# Patient Record
Sex: FEMALE | Race: BLACK OR AFRICAN AMERICAN | Employment: UNEMPLOYED | URBAN - METROPOLITAN AREA
[De-identification: names, ages, dates, MRNs, and addresses within clinical notes are randomized per-mention and may not be internally consistent; named-entity substitution may affect disease eponyms.]

---

## 2017-10-08 ENCOUNTER — HOSPITAL ENCOUNTER (EMERGENCY)
Age: 27
Discharge: HOME | End: 2017-10-08
Attending: EMERGENCY MEDICINE
Payer: SELF-PAY

## 2017-10-08 VITALS
HEART RATE: 60 BPM | DIASTOLIC BLOOD PRESSURE: 60 MMHG | RESPIRATION RATE: 18 BRPM | SYSTOLIC BLOOD PRESSURE: 134 MMHG | BODY MASS INDEX: 39.55 KG/M2 | HEIGHT: 67 IN | OXYGEN SATURATION: 100 % | TEMPERATURE: 98.7 F | WEIGHT: 252 LBS

## 2017-10-08 DIAGNOSIS — N94.6 MENSTRUAL CRAMPS: Primary | ICD-10-CM

## 2017-10-08 LAB — HCG UR QL: NEGATIVE

## 2017-10-08 PROCEDURE — 74011250636 HC RX REV CODE- 250/636: Performed by: EMERGENCY MEDICINE

## 2017-10-08 PROCEDURE — 81003 URINALYSIS AUTO W/O SCOPE: CPT | Performed by: EMERGENCY MEDICINE

## 2017-10-08 PROCEDURE — 96372 THER/PROPH/DIAG INJ SC/IM: CPT | Performed by: EMERGENCY MEDICINE

## 2017-10-08 PROCEDURE — 81025 URINE PREGNANCY TEST: CPT

## 2017-10-08 PROCEDURE — 99284 EMERGENCY DEPT VISIT MOD MDM: CPT | Performed by: EMERGENCY MEDICINE

## 2017-10-08 RX ORDER — KETOROLAC TROMETHAMINE 30 MG/ML
60 INJECTION, SOLUTION INTRAMUSCULAR; INTRAVENOUS
Status: COMPLETED | OUTPATIENT
Start: 2017-10-08 | End: 2017-10-08

## 2017-10-08 RX ADMIN — KETOROLAC TROMETHAMINE 60 MG: 30 INJECTION, SOLUTION INTRAMUSCULAR at 16:07

## 2017-10-08 NOTE — DISCHARGE INSTRUCTIONS
Painful Menstrual Cramps: Care Instructions  Your Care Instructions    Painful menstrual cramps are very common. Many women go to the doctor because of bad cramps when they get their period. You may have cramps in your back, thighs, and belly. You may also have diarrhea, constipation, or nausea. Some women also get dizzy. Pain medicine and home treatment can help you feel better. Follow-up care is a key part of your treatment and safety. Be sure to make and go to all appointments, and call your doctor if you are having problems. It's also a good idea to know your test results and keep a list of the medicines you take. How can you care for yourself at home? · Take anti-inflammatory medicines for pain. Ibuprofen (Advil, Motrin) and naproxen (Aleve) usually work better than aspirin. ¨ Be safe with medicines. Talk to your doctor or pharmacist before you take any of these medicines. They may not be safe if you take other medicines or have other health problems. ¨ Start taking the recommended dose of pain medicine as soon as you start to feel pain. Or you can start on the day before your period. Keep taking the medicine for as many days as you have cramps. ¨ If anti-inflammatory medicines don't help, try acetaminophen (Tylenol). ¨ Do not take two or more pain medicines at the same time unless the doctor told you to. Many pain medicines have acetaminophen, which is Tylenol. Too much acetaminophen (Tylenol) can be harmful. ¨ Read and follow all instructions on the label. · Put a heating pad set on low or a hot water bottle on your belly. Or take a warm bath. Heat improves blood flow and may help with pain. · Lie down and put a pillow under your knees. Or lie on your side and bring your knees up to your chest. This will help with any back pressure. · Get at least 30 minutes of exercise on most days of the week. This improves blood flow and may decrease pain. Walking is a good choice.  You also may want to do other activities, such as running, swimming, cycling, or playing tennis or team sports. When should you call for help? Call 911 anytime you think you may need emergency care. For example, call if:  · You passed out (lost consciousness). Call your doctor now or seek immediate medical care if:  · You have sudden, severe pain in your belly or pelvis. · You have severe vaginal bleeding. This means that you are soaking through your usual pads or tampons every hour for 2 or more hours and passing clots of blood. · You are dizzy or lightheaded, or you feel like you may faint. Watch closely for changes in your health, and be sure to contact your doctor if:  · You think you may be pregnant. · You have new belly or pelvic pain. · You are taking pain medicine, but it is not helping. · You feel weak and tired. Where can you learn more? Go to http://maykel-merissa.info/. Enter [id number] in the search box to learn more about \"Painful Menstrual Cramps: Care Instructions. \"  Current as of: October 13, 2016  Content Version: 11.3  © 4176-0400 [employer]. Care instructions adapted under license by [employer] (which disclaims liability or warranty for this information). If you have questions about a medical condition or this instruction, always ask your healthcare professional. [address] any warranty or liability for your use of this information.

## 2017-10-08 NOTE — LETTER
[facility] EMERGENCY DEPT 
[facility] [address] [place] [id number] 
[phone] Work/School Note Date: 10/8/2017 To Whom It May concern: 
 
Meenu Maldonado was seen and treated today in the emergency room by the following provider(s): 
Attending Provider: Heidi Ruiz MD. Meenu Maldonado may return to work on 10/9/17.  
 
Sincerely, 
 
 
 
 
Heidi Ruiz MD

## 2017-10-08 NOTE — ED PROVIDER NOTES
HPI Comments: Patient presents with menstrual cramping. She states she started her period 4 days ago and has noticed that her cramping is little bit worse than usual.  She also mentioned she might be bleeding somewhat heavier than typically. She gets regular periods. She denies any history of fibroids or ovarian cyst.  No previous abdominal surgeries. She states she is going through about a pad every 1.5 hours. She describes the pain as crampy. No vomiting, diarrhea, fevers. She took 400 mg of ibuprofen at home with limited relief. Patient states she does not believe she is pregnant. Patient is a 32 y.o. female presenting with menstrual problem. The history is provided by the patient. No  was used. Menstrual Problem   Primary symptoms include pelvic pain, vaginal bleeding. Pertinent negatives include no abdominal pain, no diarrhea, no nausea, no vomiting and no fever. Past Medical History:   Diagnosis Date    Psychiatric disorder     depression       Past Surgical History:   Procedure Laterality Date    BREAST SURGERY PROCEDURE UNLISTED      reduction         History reviewed. No pertinent family history. Social History     Social History    Marital status: SINGLE     Spouse name: N/A    Number of children: N/A    Years of education: N/A     Occupational History    Not on file. Social History Main Topics    Smoking status: Former Smoker     Packs/day: 0.50     Quit date: 2/2/2015    Smokeless tobacco: Never Used    Alcohol use Yes      Comment: socially    Drug use: No      Comment: last used 4/17/2012    Sexual activity: Not on file     Other Topics Concern    Not on file     Social History Narrative         ALLERGIES: Review of patient's allergies indicates no known allergies. Review of Systems   Constitutional: Negative for fatigue and fever. HENT: Negative for sore throat.     Gastrointestinal: Negative for abdominal pain, diarrhea, nausea and vomiting. Genitourinary: Positive for menstrual problem, pelvic pain and vaginal bleeding. Neurological: Negative for headaches. Vitals:    10/08/17 1454   BP: 126/69   Pulse: (!) 56   Resp: 18   Temp: 98.7 °F (37.1 °C)   SpO2: 99%   Weight: 114.3 kg (252 lb)   Height: 5' 7\" (1.702 m)            Physical Exam   Constitutional: She is oriented to person, place, and time. She appears well-developed and well-nourished. No distress. HENT:   Head: Normocephalic and atraumatic. Eyes: Conjunctivae and EOM are normal. Pupils are equal, round, and reactive to light. Neck: Normal range of motion. Neck supple. Cardiovascular: Normal rate, regular rhythm and normal heart sounds. Pulmonary/Chest: Effort normal and breath sounds normal. No respiratory distress. She has no wheezes. She has no rales. Abdominal: Soft. She exhibits no distension. There is no tenderness. There is no rebound. Genitourinary:   Genitourinary Comments: Patient declined a pelvic exam   Musculoskeletal: Normal range of motion. She exhibits no edema or tenderness. Neurological: She is alert and oriented to person, place, and time. Skin: Skin is warm and dry. No rash noted. She is not diaphoretic. Psychiatric: She has a normal mood and affect. Her behavior is normal.   Vitals reviewed. MDM  Number of Diagnoses or Management Options  Menstrual cramps: new and does not require workup  Diagnosis management comments: Patient appears quite comfortable with stable vital signs. We'll give Toradol. Patient declined a pelvic exam.  She states she is really not by concerned about her amount of bleeding at this time. She improved after Toradol. Patient discharged home in stable condition. Urine showed no signs of infection or pregnancy.        Amount and/or Complexity of Data Reviewed  Clinical lab tests: ordered and reviewed (Results for orders placed or performed during the hospital encounter of 10/08/17  -HCG URINE, QL. - POC       Result                                            Value                         Ref Range                       Pregnancy test,urine (POC)                        NEGATIVE                      NEG                        )  Review and summarize past medical records: yes  Independent visualization of images, tracings, or specimens: yes    Risk of Complications, Morbidity, and/or Mortality  Presenting problems: low  Diagnostic procedures: low  Management options: low    Patient Progress  Patient progress: improved    ED Course       Procedures

## 2017-10-08 NOTE — ED TRIAGE NOTES
Pt states her period is on time but she is having more pain than normal and feels she is bleeding more than normal.

## 2017-10-23 ENCOUNTER — HOSPITAL ENCOUNTER (EMERGENCY)
Age: 27
Discharge: HOME | End: 2017-10-23
Attending: EMERGENCY MEDICINE
Payer: SELF-PAY

## 2017-10-23 VITALS
OXYGEN SATURATION: 99 % | RESPIRATION RATE: 16 BRPM | HEART RATE: 76 BPM | SYSTOLIC BLOOD PRESSURE: 128 MMHG | TEMPERATURE: 98 F | BODY MASS INDEX: 39.55 KG/M2 | WEIGHT: 252 LBS | HEIGHT: 67 IN | DIASTOLIC BLOOD PRESSURE: 76 MMHG

## 2017-10-23 DIAGNOSIS — J01.90 ACUTE NON-RECURRENT SINUSITIS, UNSPECIFIED LOCATION: Primary | ICD-10-CM

## 2017-10-23 PROCEDURE — 99283 EMERGENCY DEPT VISIT LOW MDM: CPT | Performed by: EMERGENCY MEDICINE

## 2017-10-23 RX ORDER — PROMETHAZINE HYDROCHLORIDE 25 MG/1
25 TABLET ORAL
Qty: 12 TAB | Refills: 0 | Status: SHIPPED | OUTPATIENT
Start: 2017-10-23 | End: 2017-11-21 | Stop reason: ALTCHOICE

## 2017-10-23 RX ORDER — GUAIFENESIN AND PSEUDOEPHEDRINE HCL 1200; 120 MG/1; MG/1
1 TABLET, EXTENDED RELEASE ORAL EVERY 12 HOURS
Qty: 20 TAB | Refills: 0 | Status: SHIPPED | OUTPATIENT
Start: 2017-10-23

## 2017-10-23 NOTE — LETTER
400 [facility] EMERGENCY DEPT 
[facility] 52 [address] [facility] [id number] 
[phone] Work/School Note Date: 10/23/2017 To Whom It May concern: 
 
Ralph Wiggins was seen and treated today in the emergency room by the following provider(s): 
Attending Provider: Elba Cantu MD. Ralph Wiggins may return to work on 10/24/2017.  
 
Sincerely,

## 2017-10-24 NOTE — ED NOTES
I have reviewed discharge instructions with the patient. The patient verbalized understanding. Patient left ED via Discharge Method: ambulatory to Home with self and family member. Opportunity for questions and clarification provided. Patient given 2 scripts.

## 2017-10-24 NOTE — DISCHARGE INSTRUCTIONS
Saline Nasal Washes: Care Instructions  Your Care Instructions  Saline nasal washes help keep the nasal passages open by washing out thick or dried mucus. This simple remedy can help relieve symptoms of allergies, sinusitis, and colds. It also can make the nose feel more comfortable by keeping the mucous membranes moist. You may notice a little burning sensation in your nose the first few times you use the solution, but this usually gets better in a few days. Follow-up care is a key part of your treatment and safety. Be sure to make and go to all appointments, and call your doctor if you are having problems. It's also a good idea to know your test results and keep a list of the medicines you take. How can you care for yourself at home? · You can buy premixed saline solution in a squeeze bottle or other sinus rinse products at a drugstore. Read and follow the instructions on the label. · You also can make your own saline solution by adding 1 teaspoon of salt and 1 teaspoon of baking soda to 2 cups of distilled water. · If you use a homemade solution, pour a small amount into a clean bowl. Using a rubber bulb syringe, squeeze the syringe and place the tip in the salt water. Pull a small amount of the salt water into the syringe by relaxing your hand. · Sit down with your head tilted slightly back. Do not lie down. Put the tip of the bulb syringe or the squeeze bottle a little way into one of your nostrils. Gently drip or squirt a few drops into the nostril. Repeat with the other nostril. Some sneezing and gagging are normal at first.  · Gently blow your nose. · Wipe the syringe or bottle tip clean after each use. · Repeat this 2 or 3 times a day. · Use nasal washes gently if you have nosebleeds often. When should you call for help? Watch closely for changes in your health, and be sure to contact your doctor if:  · You often get nosebleeds. · You have problems doing the nasal washes.   Where can you learn more? Go to http://maykel-merissa.info/. Enter [id number] in the search box to learn more about \"Saline Nasal Washes: Care Instructions. \"  Current as of: May 4, 2017  Content Version: 11.3  © 0860-4518 [employer]. Care instructions adapted under license by [employer] (which disclaims liability or warranty for this information). If you have questions about a medical condition or this instruction, always ask your healthcare professional. [address] any warranty or liability for your use of this information. Sinusitis: Care Instructions  Your Care Instructions    Sinusitis is an infection of the lining of the sinus cavities in your head. Sinusitis often follows a cold. It causes pain and pressure in your head and face. In most cases, sinusitis gets better on its own in 1 to 2 weeks. But some mild symptoms may last for several weeks. Sometimes antibiotics are needed. Follow-up care is a key part of your treatment and safety. Be sure to make and go to all appointments, and call your doctor if you are having problems. It's also a good idea to know your test results and keep a list of the medicines you take. How can you care for yourself at home? · Take an over-the-counter pain medicine, such as acetaminophen (Tylenol), ibuprofen (Advil, Motrin), or naproxen (Aleve). Read and follow all instructions on the label. · If the doctor prescribed antibiotics, take them as directed. Do not stop taking them just because you feel better. You need to take the full course of antibiotics. · Be careful when taking over-the-counter cold or flu medicines and Tylenol at the same time. Many of these medicines have acetaminophen, which is Tylenol. Read the labels to make sure that you are not taking more than the recommended dose. Too much acetaminophen (Tylenol) can be harmful.   · Breathe warm, moist air from a steamy shower, a hot bath, or a sink filled with hot water. Avoid cold, dry air. Using a humidifier in your home may help. Follow the directions for cleaning the machine. · Use saline (saltwater) nasal washes to help keep your nasal passages open and wash out mucus and bacteria. You can buy saline nose drops at a grocery store or drugstore. Or you can make your own at home by adding 1 teaspoon of salt and 1 teaspoon of baking soda to 2 cups of distilled water. If you make your own, fill a bulb syringe with the solution, insert the tip into your nostril, and squeeze gently. Roz Hals your nose. · Put a hot, wet towel or a warm gel pack on your face 3 or 4 times a day for 5 to 10 minutes each time. · Try a decongestant nasal spray like oxymetazoline (Afrin). Do not use it for more than 3 days in a row. Using it for more than 3 days can make your congestion worse. When should you call for help? Call your doctor now or seek immediate medical care if:  · You have new or worse swelling or redness in your face or around your eyes. · You have a new or higher fever. Watch closely for changes in your health, and be sure to contact your doctor if:  · You have new or worse facial pain. · The mucus from your nose becomes thicker (like pus) or has new blood in it. · You are not getting better as expected. Where can you learn more? Go to http://maykel-merissa.info/. Enter I712 in the search box to learn more about \"Sinusitis: Care Instructions. \"  Current as of: July 29, 2016  Content Version: 11.3  © 0248-4620 [employer]. Care instructions adapted under license by [employer] (which disclaims liability or warranty for this information). If you have questions about a medical condition or this instruction, always ask your healthcare professional. [address] any warranty or liability for your use of this information.

## 2017-10-24 NOTE — ED PROVIDER NOTES
HPI Comments: 31 yo female presents with pain in her left face and neck for the past 4 days. She states pain worse at night she is unable to sleep. She has sinus congestion, no drainage. Denies sore throat. She is currently taking clindamycin for left dental pain. She reports nausea with any attempt at eating. No vomiting. She only occasionally takes antibiotic with food. No fever. Patient is a 32 y.o. female presenting with ear pain and headaches. The history is provided by the patient. Ear Pain    Associated symptoms include headaches. Pertinent negatives include no hearing loss, no sore throat, no abdominal pain, no diarrhea, no vomiting, no cough and no rash. Headache    Associated symptoms include nausea. Pertinent negatives include no fever, no palpitations, no shortness of breath, no weakness and no vomiting. Past Medical History:   Diagnosis Date    Psychiatric disorder     depression       Past Surgical History:   Procedure Laterality Date    BREAST SURGERY PROCEDURE UNLISTED      reduction         No family history on file. Social History     Social History    Marital status: SINGLE     Spouse name: N/A    Number of children: N/A    Years of education: N/A     Occupational History    Not on file. Social History Main Topics    Smoking status: Current Some Day Smoker     Packs/day: 0.50     Last attempt to quit: 10/16/2017    Smokeless tobacco: Never Used    Alcohol use Yes      Comment: socially    Drug use: No      Comment: last used 4/17/2012    Sexual activity: Not on file     Other Topics Concern    Not on file     Social History Narrative         ALLERGIES: Review of patient's allergies indicates no known allergies. Review of Systems   Constitutional: Negative for chills and fever. HENT: Positive for congestion, ear pain, sinus pain and sinus pressure. Negative for hearing loss and sore throat. Eyes: Negative for visual disturbance.    Respiratory: Negative for cough and shortness of breath. Cardiovascular: Negative for chest pain and palpitations. Gastrointestinal: Positive for nausea. Negative for abdominal pain, diarrhea and vomiting. Musculoskeletal: Negative for back pain. Skin: Negative for rash. Neurological: Positive for headaches. Negative for weakness. Psychiatric/Behavioral: Negative for confusion. Vitals:    10/23/17 2153   BP: 133/79   Pulse: 73   Resp: 18   Temp: 98 °F (36.7 °C)   SpO2: 99%   Weight: 114.3 kg (252 lb)   Height: 5' 7\" (1.702 m)            Physical Exam   Constitutional: She appears well-developed and well-nourished. HENT:   Head: Normocephalic and atraumatic. Right Ear: Tympanic membrane and external ear normal.   Left Ear: Tympanic membrane and external ear normal.   Nose: Left sinus exhibits maxillary sinus tenderness and frontal sinus tenderness. Mouth/Throat: Uvula is midline and oropharynx is clear and moist. No trismus in the jaw. No oropharyngeal exudate or posterior oropharyngeal erythema. Eyes: Conjunctivae are normal. Pupils are equal, round, and reactive to light. Neck: Normal range of motion. Neck supple. No erythema and normal range of motion present. Cardiovascular: Regular rhythm, normal heart sounds and intact distal pulses. Pulmonary/Chest: Effort normal and breath sounds normal. No respiratory distress. She has no wheezes. Abdominal: Soft. Bowel sounds are normal. She exhibits no distension. There is no tenderness. Musculoskeletal: Normal range of motion. She exhibits no edema. Neurological: She is alert. Skin: Skin is warm and dry. Psychiatric: Judgment normal.   Nursing note and vitals reviewed. MDM  Number of Diagnoses or Management Options  Acute non-recurrent sinusitis, unspecified location:   Diagnosis management comments: Parts of this document were created using dragon voice recognition software.  The chart has been reviewed but errors may still be present. Advised Mucinex D. Given Phenergan for nausea. Advised to take antibiotic with food. I discussed the results of all labs, procedures, radiographs, and treatments with the patient and available family. Treatment plan is agreed upon and the patient is ready for discharge. Questions about treatment in the ED and differential diagnosis of presenting condition were answered. Patient was given verbal discharge instructions including, but not limited to, importance of returning to the emergency department for any concern of worsening or continued symptoms. Instructions were given to follow up with a primary care provider or specialist within 1-2 days. Adverse effects of medications, if prescribed, were discussed and patient was advised to refrain from significant physical activity until followed up by primary care physician and to not drive or operate heavy machinery after taking any sedating substances.         ED Course       Procedures

## 2017-10-25 ENCOUNTER — APPOINTMENT (OUTPATIENT)
Dept: CT IMAGING | Age: 27
End: 2017-10-25
Attending: EMERGENCY MEDICINE
Payer: SELF-PAY

## 2017-10-25 ENCOUNTER — HOSPITAL ENCOUNTER (EMERGENCY)
Age: 27
Discharge: HOME | End: 2017-10-25
Attending: EMERGENCY MEDICINE
Payer: SELF-PAY

## 2017-10-25 VITALS
WEIGHT: 252 LBS | RESPIRATION RATE: 16 BRPM | OXYGEN SATURATION: 98 % | SYSTOLIC BLOOD PRESSURE: 140 MMHG | DIASTOLIC BLOOD PRESSURE: 80 MMHG | TEMPERATURE: 98.9 F | BODY MASS INDEX: 39.55 KG/M2 | HEIGHT: 67 IN | HEART RATE: 60 BPM

## 2017-10-25 DIAGNOSIS — R22.1 NECK SWELLING: Primary | ICD-10-CM

## 2017-10-25 LAB
ALBUMIN SERPL-MCNC: 3.8 G/DL (ref 3.5–5)
ALBUMIN/GLOB SERPL: 0.9 {RATIO} (ref 1.2–3.5)
ALP SERPL-CCNC: 77 U/L (ref 50–136)
ALT SERPL-CCNC: 28 U/L (ref 12–65)
ANION GAP SERPL CALC-SCNC: 10 MMOL/L (ref 7–16)
AST SERPL-CCNC: 18 U/L (ref 15–37)
BASOPHILS # BLD: 0 K/UL (ref 0–0.2)
BASOPHILS NFR BLD: 0 % (ref 0–2)
BILIRUB SERPL-MCNC: 0.3 MG/DL (ref 0.2–1.1)
BUN SERPL-MCNC: 7 MG/DL (ref 6–23)
CALCIUM SERPL-MCNC: 9.5 MG/DL (ref 8.3–10.4)
CHLORIDE SERPL-SCNC: 102 MMOL/L (ref 98–107)
CO2 SERPL-SCNC: 27 MMOL/L (ref 21–32)
CREAT SERPL-MCNC: 0.78 MG/DL (ref 0.6–1)
DEPRECATED S PYO AG THROAT QL EIA: NEGATIVE
DIFFERENTIAL METHOD BLD: ABNORMAL
EOSINOPHIL # BLD: 0.2 K/UL (ref 0–0.8)
EOSINOPHIL NFR BLD: 2 % (ref 0.5–7.8)
ERYTHROCYTE [DISTWIDTH] IN BLOOD BY AUTOMATED COUNT: 15.3 % (ref 11.9–14.6)
GLOBULIN SER CALC-MCNC: 4.2 G/DL (ref 2.3–3.5)
GLUCOSE SERPL-MCNC: 95 MG/DL (ref 65–100)
HCT VFR BLD AUTO: 39.1 % (ref 35.8–46.3)
HGB BLD-MCNC: 12.5 G/DL (ref 11.7–15.4)
IMM GRANULOCYTES # BLD: 0 K/UL (ref 0–0.5)
IMM GRANULOCYTES NFR BLD: 0 % (ref 0–5)
LYMPHOCYTES # BLD: 3 K/UL (ref 0.5–4.6)
LYMPHOCYTES NFR BLD: 41 % (ref 13–44)
MCH RBC QN AUTO: 25.8 PG (ref 26.1–32.9)
MCHC RBC AUTO-ENTMCNC: 32 G/DL (ref 31.4–35)
MCV RBC AUTO: 80.6 FL (ref 79.6–97.8)
MONOCYTES # BLD: 0.5 K/UL (ref 0.1–1.3)
MONOCYTES NFR BLD: 7 % (ref 4–12)
NEUTS SEG # BLD: 3.6 K/UL (ref 1.7–8.2)
NEUTS SEG NFR BLD: 50 % (ref 43–78)
PLATELET # BLD AUTO: 368 K/UL (ref 150–450)
PMV BLD AUTO: 9.3 FL (ref 10.8–14.1)
POTASSIUM SERPL-SCNC: 3.7 MMOL/L (ref 3.5–5.1)
PROT SERPL-MCNC: 8 G/DL (ref 6.3–8.2)
RBC # BLD AUTO: 4.85 M/UL (ref 4.05–5.25)
SODIUM SERPL-SCNC: 139 MMOL/L (ref 136–145)
T4 FREE SERPL-MCNC: 1.1 NG/DL (ref 0.78–1.46)
TSH SERPL DL<=0.005 MIU/L-ACNC: 2.2 UIU/ML (ref 0.36–3.74)
WBC # BLD AUTO: 7.3 K/UL (ref 4.3–11.1)

## 2017-10-25 PROCEDURE — 87880 STREP A ASSAY W/OPTIC: CPT | Performed by: EMERGENCY MEDICINE

## 2017-10-25 PROCEDURE — 74011000258 HC RX REV CODE- 258: Performed by: EMERGENCY MEDICINE

## 2017-10-25 PROCEDURE — 80053 COMPREHEN METABOLIC PANEL: CPT | Performed by: EMERGENCY MEDICINE

## 2017-10-25 PROCEDURE — 70491 CT SOFT TISSUE NECK W/DYE: CPT

## 2017-10-25 PROCEDURE — 99283 EMERGENCY DEPT VISIT LOW MDM: CPT | Performed by: EMERGENCY MEDICINE

## 2017-10-25 PROCEDURE — 84443 ASSAY THYROID STIM HORMONE: CPT | Performed by: EMERGENCY MEDICINE

## 2017-10-25 PROCEDURE — 87081 CULTURE SCREEN ONLY: CPT | Performed by: EMERGENCY MEDICINE

## 2017-10-25 PROCEDURE — 84439 ASSAY OF FREE THYROXINE: CPT | Performed by: EMERGENCY MEDICINE

## 2017-10-25 PROCEDURE — 74011636320 HC RX REV CODE- 636/320: Performed by: EMERGENCY MEDICINE

## 2017-10-25 PROCEDURE — 85025 COMPLETE CBC W/AUTO DIFF WBC: CPT | Performed by: EMERGENCY MEDICINE

## 2017-10-25 RX ORDER — SODIUM CHLORIDE 0.9 % (FLUSH) 0.9 %
10 SYRINGE (ML) INJECTION
Status: COMPLETED | OUTPATIENT
Start: 2017-10-25 | End: 2017-10-25

## 2017-10-25 RX ORDER — AMOXICILLIN AND CLAVULANATE POTASSIUM 875; 125 MG/1; MG/1
1 TABLET, FILM COATED ORAL 2 TIMES DAILY
Qty: 20 TAB | Refills: 0 | Status: SHIPPED | OUTPATIENT
Start: 2017-10-25 | End: 2017-11-04

## 2017-10-25 RX ADMIN — Medication 10 ML: at 16:23

## 2017-10-25 RX ADMIN — SODIUM CHLORIDE 100 ML: 900 INJECTION, SOLUTION INTRAVENOUS at 16:23

## 2017-10-25 RX ADMIN — IOPAMIDOL 80 ML: 755 INJECTION, SOLUTION INTRAVENOUS at 16:23

## 2017-10-25 NOTE — LETTER
[address] [facility] EMERGENCY DEPT 
[address] [place] [id number] 
[phone] Work/School Note Date: 10/25/2017 To Whom It May concern: 
 
Dolores Pabon was seen and treated today in the emergency room by the following provider(s): 
Attending Provider: Octavio Medina MD. Dolores Pabon may return to work on Friday, October 27th, 2017 Sincerely, 
 
 
 
 
Chad Gambino RN

## 2017-10-25 NOTE — PROGRESS NOTES
Visited with patient as no PCP listed in chart. Patient states no PCP and no insurance. Explained the [address] program in detail and provided patient with resources. Contact information for [facility] with [address] given to patient and patient encouraged to follow up with her as soon as possible. Patient also given contact information for [facility] with [place] and and encouraged to call to get screened for Medicaid/Insurance eligibility and/or financial assistance.

## 2017-10-25 NOTE — ED PROVIDER NOTES
HPI Comments: Patient is a 31 yo female with presents with neck pain and swelling. Patient states pain and swelling for the past 4-5 days, states gradually worsening, states located on the front and sides. Denies any chest pain, no nausea or vomiting, no trouble breathing or swallowing, no further complaints. Overall patient is well appearing, NAD. Patient is a 32 y.o. female presenting with sore throat. The history is provided by the patient. No  was used. Sore Throat    Pertinent negatives include no diarrhea, no vomiting, no headaches, no shortness of breath and no cough. Past Medical History:   Diagnosis Date    Psychiatric disorder     depression       Past Surgical History:   Procedure Laterality Date    BREAST SURGERY PROCEDURE UNLISTED      reduction         History reviewed. No pertinent family history. Social History     Social History    Marital status: SINGLE     Spouse name: N/A    Number of children: N/A    Years of education: N/A     Occupational History    Not on file. Social History Main Topics    Smoking status: Current Some Day Smoker     Packs/day: 0.50     Last attempt to quit: 10/16/2017    Smokeless tobacco: Never Used    Alcohol use Yes      Comment: socially    Drug use: No      Comment: last used 4/17/2012    Sexual activity: Not on file     Other Topics Concern    Not on file     Social History Narrative         ALLERGIES: Review of patient's allergies indicates no known allergies. Review of Systems   Constitutional: Negative for chills and fever. HENT: Positive for sore throat. Negative for rhinorrhea. Eyes: Negative for visual disturbance. Respiratory: Negative for cough and shortness of breath. Cardiovascular: Negative for chest pain and leg swelling. Gastrointestinal: Negative for abdominal pain, diarrhea, nausea and vomiting. Genitourinary: Negative for dysuria.    Musculoskeletal: Negative for back pain and neck pain.   Skin: Negative for rash. Neurological: Negative for weakness and headaches. Psychiatric/Behavioral: The patient is not nervous/anxious. Vitals:    10/25/17 1353   BP: 138/82   Pulse: 64   Resp: 16   Temp: 98.8 °F (37.1 °C)   SpO2: 98%   Weight: 114.3 kg (252 lb)   Height: 5' 7\" (1.702 m)            Physical Exam   Constitutional: She is oriented to person, place, and time. She appears well-developed and well-nourished. HENT:   Head: Normocephalic. Right Ear: External ear normal.   Left Ear: External ear normal.   Swelling noted to right side of neck and anteriorly. No bruit, no trismus, no trouble swallowing or breathing. Eyes: Conjunctivae and EOM are normal. Pupils are equal, round, and reactive to light. Neck: Normal range of motion. Neck supple. No tracheal deviation present. Cardiovascular: Normal rate, regular rhythm, normal heart sounds and intact distal pulses. No murmur heard. Pulmonary/Chest: Effort normal and breath sounds normal. No respiratory distress. Abdominal: Soft. There is no tenderness. Musculoskeletal: Normal range of motion. Neurological: She is alert and oriented to person, place, and time. No cranial nerve deficit. Skin: No rash noted. Nursing note and vitals reviewed.        MDM  Number of Diagnoses or Management Options     Amount and/or Complexity of Data Reviewed  Clinical lab tests: reviewed and ordered  Tests in the radiology section of CPT®: ordered and reviewed  Tests in the medicine section of CPT®: ordered and reviewed  Review and summarize past medical records: yes    Risk of Complications, Morbidity, and/or Mortality  Presenting problems: high  Diagnostic procedures: high  Management options: high    Patient Progress  Patient progress: stable    ED Course       Procedures    Recent Results (from the past 12 hour(s))   STREP AG SCREEN, GROUP A    Collection Time: 10/25/17  1:57 PM   Result Value Ref Range    Group A Strep Ag ID NEGATIVE NEG     CBC WITH AUTOMATED DIFF    Collection Time: 10/25/17  3:32 PM   Result Value Ref Range    WBC 7.3 4.3 - 11.1 K/uL    RBC 4.85 4.05 - 5.25 M/uL    HGB 12.5 11.7 - 15.4 g/dL    HCT 39.1 35.8 - 46.3 %    MCV 80.6 79.6 - 97.8 FL    MCH 25.8 (L) 26.1 - 32.9 PG    MCHC 32.0 31.4 - 35.0 g/dL    RDW 15.3 (H) 11.9 - 14.6 %    PLATELET 763 752 - 301 K/uL    MPV 9.3 (L) 10.8 - 14.1 FL    DF AUTOMATED      NEUTROPHILS 50 43 - 78 %    LYMPHOCYTES 41 13 - 44 %    MONOCYTES 7 4.0 - 12.0 %    EOSINOPHILS 2 0.5 - 7.8 %    BASOPHILS 0 0.0 - 2.0 %    IMMATURE GRANULOCYTES 0 0.0 - 5.0 %    ABS. NEUTROPHILS 3.6 1.7 - 8.2 K/UL    ABS. LYMPHOCYTES 3.0 0.5 - 4.6 K/UL    ABS. MONOCYTES 0.5 0.1 - 1.3 K/UL    ABS. EOSINOPHILS 0.2 0.0 - 0.8 K/UL    ABS. BASOPHILS 0.0 0.0 - 0.2 K/UL    ABS. IMM. GRANS. 0.0 0.0 - 0.5 K/UL   METABOLIC PANEL, COMPREHENSIVE    Collection Time: 10/25/17  3:32 PM   Result Value Ref Range    Sodium 139 136 - 145 mmol/L    Potassium 3.7 3.5 - 5.1 mmol/L    Chloride 102 98 - 107 mmol/L    CO2 27 21 - 32 mmol/L    Anion gap 10 7 - 16 mmol/L    Glucose 95 65 - 100 mg/dL    BUN 7 6 - 23 MG/DL    Creatinine 0.78 0.6 - 1.0 MG/DL    GFR est AA >60 >60 ml/min/1.73m2    GFR est non-AA >60 >60 ml/min/1.73m2    Calcium 9.5 8.3 - 10.4 MG/DL    Bilirubin, total 0.3 0.2 - 1.1 MG/DL    ALT (SGPT) 28 12 - 65 U/L    AST (SGOT) 18 15 - 37 U/L    Alk. phosphatase 77 50 - 136 U/L    Protein, total 8.0 6.3 - 8.2 g/dL    Albumin 3.8 3.5 - 5.0 g/dL    Globulin 4.2 (H) 2.3 - 3.5 g/dL    A-G Ratio 0.9 (L) 1.2 - 3.5     TSH 3RD GENERATION    Collection Time: 10/25/17  3:32 PM   Result Value Ref Range    TSH 2.203 0.358 - 3.740 uIU/mL     Ct Neck Soft Tissue W Cont    Result Date: 10/25/2017  CT NECK WITH CONTRAST. HISTORY:  Sore throat.  TECHNIQUE:  3.75 mm axial scans with coronal reconstruction from the obits to the aortic arch following 80 cc intravenous contrast.  Intravenous contrast was given to increase the sensitivity to acute inflammation. COMPARISON:  None. FINDINGS:  Intracranial contents unremarkable. Frontal sinuses, ethmoid air cells, maxillary sinuses are clear. There is mucosal thickening or fluid in the sphenoid sinuses. Mastoid air cells are clear. Globes and orbits are unremarkable. Parotid glands appear symmetric. Submandibular glands appear symmetric. Thyroid gland appears uniform. Airways widely patent. Scattered small cervical lymph nodes. No gross adenopathy. Lung apices are unremarkable. No gross bony abnormality. IMPRESSION:  Small amount of sphenoid sinus mucosal thickening. 31 yo female with neck pain and swelling:     Patient very well appearing, NAD, no trouble breathing or swallowing, no stridor. Patient to follow up with PCP in 1-2 days for follow up on results of Free T4 which is pending and I will treat for sinusitis given mucosal thickening with possible lymph node swelling as cause of symptoms. I discussed at length return with any worsening swelling, any trouble swallowing or breathing or any further concerns.

## 2017-10-25 NOTE — ED TRIAGE NOTES
Pt.c/o neck and throat discomfortX  5 days. Denies fever at home. Pt.states \"my neck feel swollen. \" No lymph node swelling noted.

## 2017-10-27 LAB
BACTERIA SPEC CULT: NORMAL
SERVICE CMNT-IMP: NORMAL

## 2019-10-25 ENCOUNTER — HOSPITAL ENCOUNTER (EMERGENCY)
Age: 29
Discharge: HOME | End: 2019-10-25
Attending: EMERGENCY MEDICINE
Payer: SELF-PAY

## 2019-10-25 VITALS
HEART RATE: 74 BPM | DIASTOLIC BLOOD PRESSURE: 70 MMHG | WEIGHT: 263 LBS | TEMPERATURE: 98.7 F | OXYGEN SATURATION: 99 % | BODY MASS INDEX: 41.28 KG/M2 | RESPIRATION RATE: 17 BRPM | SYSTOLIC BLOOD PRESSURE: 131 MMHG | HEIGHT: 67 IN

## 2019-10-25 DIAGNOSIS — R22.0 LEFT FACIAL SWELLING: Primary | ICD-10-CM

## 2019-10-25 DIAGNOSIS — K08.89 PAIN, DENTAL: ICD-10-CM

## 2019-10-25 LAB
ANION GAP SERPL CALC-SCNC: 8 MMOL/L (ref 7–16)
BUN SERPL-MCNC: 8 MG/DL (ref 6–23)
CALCIUM SERPL-MCNC: 9.4 MG/DL (ref 8.3–10.4)
CHLORIDE SERPL-SCNC: 104 MMOL/L (ref 98–107)
CO2 SERPL-SCNC: 26 MMOL/L (ref 21–32)
CREAT SERPL-MCNC: 0.76 MG/DL (ref 0.6–1)
ERYTHROCYTE [DISTWIDTH] IN BLOOD BY AUTOMATED COUNT: 18.3 % (ref 11.9–14.6)
GLUCOSE BLD STRIP.AUTO-MCNC: 109 MG/DL (ref 65–100)
GLUCOSE SERPL-MCNC: 93 MG/DL (ref 65–100)
HCT VFR BLD AUTO: 38.6 % (ref 35.8–46.3)
HGB BLD-MCNC: 12 G/DL (ref 11.7–15.4)
MCH RBC QN AUTO: 24.4 PG (ref 26.1–32.9)
MCHC RBC AUTO-ENTMCNC: 31.1 G/DL (ref 31.4–35)
MCV RBC AUTO: 78.6 FL (ref 79.6–97.8)
NRBC # BLD: 0 K/UL (ref 0–0.2)
PLATELET # BLD AUTO: 497 K/UL (ref 150–450)
PMV BLD AUTO: 9.5 FL (ref 9.4–12.3)
POTASSIUM SERPL-SCNC: 3.9 MMOL/L (ref 3.5–5.1)
RBC # BLD AUTO: 4.91 M/UL (ref 4.05–5.2)
SODIUM SERPL-SCNC: 138 MMOL/L (ref 136–145)
WBC # BLD AUTO: 8.5 K/UL (ref 4.3–11.1)

## 2019-10-25 PROCEDURE — 85027 COMPLETE CBC AUTOMATED: CPT

## 2019-10-25 PROCEDURE — 81003 URINALYSIS AUTO W/O SCOPE: CPT | Performed by: EMERGENCY MEDICINE

## 2019-10-25 PROCEDURE — 82962 GLUCOSE BLOOD TEST: CPT

## 2019-10-25 PROCEDURE — 99284 EMERGENCY DEPT VISIT MOD MDM: CPT | Performed by: EMERGENCY MEDICINE

## 2019-10-25 PROCEDURE — 80048 BASIC METABOLIC PNL TOTAL CA: CPT

## 2019-10-25 RX ORDER — PENICILLIN V POTASSIUM 500 MG/1
500 TABLET, FILM COATED ORAL 3 TIMES DAILY
Qty: 30 TAB | Refills: 0 | Status: SHIPPED | OUTPATIENT
Start: 2019-10-25 | End: 2019-11-04

## 2019-10-25 RX ORDER — NAPROXEN 500 MG/1
500 TABLET ORAL 2 TIMES DAILY WITH MEALS
Qty: 20 TAB | Refills: 0 | Status: SHIPPED | OUTPATIENT
Start: 2019-10-25 | End: 2019-11-04

## 2019-10-25 NOTE — DISCHARGE INSTRUCTIONS
Cold compress to jaw  Take medications as directed     Soft foods    If you get worse - or have new problems, please come back    Recent Results (from the past 8 hour(s))   GLUCOSE, POC    Collection Time: 10/25/19  1:56 PM   Result Value Ref Range    Glucose (POC) 109 (H) 65 - 100 mg/dL   CBC W/O DIFF    Collection Time: 10/25/19  1:57 PM   Result Value Ref Range    WBC 8.5 4.3 - 11.1 K/uL    RBC 4.91 4.05 - 5.2 M/uL    HGB 12.0 11.7 - 15.4 g/dL    HCT 38.6 35.8 - 46.3 %    MCV 78.6 (L) 79.6 - 97.8 FL    MCH 24.4 (L) 26.1 - 32.9 PG    MCHC 31.1 (L) 31.4 - 35.0 g/dL    RDW 18.3 (H) 11.9 - 14.6 %    PLATELET 037 (H) 578 - 450 K/uL    MPV 9.5 9.4 - 12.3 FL    ABSOLUTE NRBC 0.00 0.0 - 0.2 K/uL

## 2019-10-25 NOTE — ED PROVIDER NOTES
[facility] Emergency Department  Arrival Date/Time: 10/25/2019  1:05 PM      Ashwin Dawson  MRN: [id number]    YOB: 1990   29 y.o. female    [facility] EMERGENCY DEPT ER08/08  Seen on 10/25/2019 @ 1:34 PM      Today's Chief Complaint:   Chief Complaint   Patient presents with    Other     HPI: 27-year-old female presents to the emergency department with discomfort on the left side of her head radiating down into her neck. She just denies pain. States it started 4 days ago. Has not changed. She just reports feeling miserable and weak. States that she feels terrible at work and all she does when she is at home as sleeps    She has not eaten since last night at 10 in the morning because she did not have much of an appetite. No fever. No nausea or vomiting. No shortness of breath. No focal weakness. No syncope. No shaking or tremor    Denies trauma or head injury. No changes in her vision or hearing. HPI    Review of Systems: Review of Systems   Constitutional: Positive for activity change, appetite change and fatigue. Negative for chills, diaphoresis and fever. HENT: Positive for dental problem. Eyes: Negative. Respiratory: Negative for cough, choking, chest tightness, shortness of breath and wheezing. Cardiovascular: Negative for chest pain, palpitations and leg swelling. Gastrointestinal: Negative for abdominal pain, nausea and vomiting. Genitourinary: Negative. Musculoskeletal: Negative. Neurological: Negative. Psychiatric/Behavioral: Negative. Past Medical History: Primary Care Doctor: Ed Lau MD  Meds, PMH, PSHx, SocHx at end of this note     Allergies: No Known Allergies      Key Anti-Platelet Anticoagulant Meds     The patient is on no antiplatelet meds or anticoagulants. Physical Exam:  Nursing documentation reviewed.      Vitals:    10/25/19 1259   BP: (!) 145/93   Pulse: 71   Resp: 18   Temp: 98.7 °F (37.1 °C)   SpO2: 99%    Vital signs were reviewed. Physical Exam   Constitutional: She is oriented to person, place, and time. She appears well-developed and well-nourished. No distress. HENT:   Head: Normocephalic. Right Ear: External ear normal.   Left Ear: External ear normal.   Nose: Nose normal.   Mouth/Throat: No oropharyngeal exudate. Eyes: Pupils are equal, round, and reactive to light. EOM are normal.   No nystagmus  Pupils are equal and reactive   Neck: Normal range of motion. Neck supple. Cardiovascular: Normal rate, regular rhythm and normal heart sounds. Pulmonary/Chest: Breath sounds normal. No stridor. No respiratory distress. Musculoskeletal: Normal range of motion. She exhibits no edema or deformity. Neurological: She is alert and oriented to person, place, and time. Tongue and facial movements are intact. Extraocular movements are intact. Full range of motion of the neck. Lips are equal.  No pronator drift. Skin: Skin is warm and dry. Capillary refill takes less than 2 seconds. She is not diaphoretic. Psychiatric: She has a normal mood and affect. Her behavior is normal. Thought content normal.   Nursing note and vitals reviewed. MEDICAL DECISION MAKING:   Differential Diagnosis:    MDM  Number of Diagnoses or Management Options  Diagnosis management comments: 44-year-old female with head discomfort feeling of fullness. Burning pain across her neck and chest.  Felt swollen earlier today  Mental status is normal.  No trauma. No slurred speech or confusion  No fever or chills  Normal neurologic exam.    I do not think CT imaging is indicated-no trauma, no focal deficits. No history of seizure or syncope. Her physical exam is unremarkable. We will check BMP.  CBC. Urine and urine pregnancy.        Amount and/or Complexity of Data Reviewed  Clinical lab tests: ordered    Risk of Complications, Morbidity, and/or Mortality  Presenting problems: moderate  Diagnostic procedures: minimal  Management options: moderate          Data/Management:    Lab findings during this visit:   Recent Results (from the past 48 hour(s))   GLUCOSE, POC    Collection Time: 10/25/19  1:56 PM   Result Value Ref Range    Glucose (POC) 109 (H) 65 - 100 mg/dL   CBC W/O DIFF    Collection Time: 10/25/19  1:57 PM   Result Value Ref Range    WBC 8.5 4.3 - 11.1 K/uL    RBC 4.91 4.05 - 5.2 M/uL    HGB 12.0 11.7 - 15.4 g/dL    HCT 38.6 35.8 - 46.3 %    MCV 78.6 (L) 79.6 - 97.8 FL    MCH 24.4 (L) 26.1 - 32.9 PG    MCHC 31.1 (L) 31.4 - 35.0 g/dL    RDW 18.3 (H) 11.9 - 14.6 %    PLATELET 538 (H) 726 - 450 K/uL    MPV 9.5 9.4 - 12.3 FL    ABSOLUTE NRBC 0.00 0.0 - 0.2 K/uL       Radiology studies during this visit: No results found. Medications given in the ED: Medications - No data to display    Procedure Documentation:   Procedures     Other ED Course Notes:        Assessment and Plan:    Impression:     ICD-10-CM ICD-9-CM   1. Left facial swelling R22.0 784.2   2. Pain, dental K08.89 525.9     Disposition:      Follow-up:   Follow-up Information     Follow up With Specialties Details Why Contact Info    Geovanny Velásquez MD [address] [address]  [phone]      [facility] EMERGENCY DEPT Emergency Medicine   [address] [place] [address] Alisha Benavidez [facility] 15.    Constance Wood MD Oral Surgery Call in 1 week  [address]  [phone]            Discharge Medications:   Current Discharge Medication List      START taking these medications    Details   penicillin v potassium (VEETID) 500 mg tablet Take 1 Tab by mouth three (3) times daily for 10 days. Qty: 30 Tab, Refills: 0      naproxen (NAPROSYN) 500 mg tablet Take 1 Tab by mouth two (2) times daily (with meals) for 10 days.   Qty: 20 Tab, Refills: 0             Past Medical History:      Past Medical History:   Diagnosis Date    Psychiatric disorder     depression     Past Surgical History:   Procedure Laterality Date    BREAST SURGERY PROCEDURE UNLISTED      reduction     Social History     Tobacco Use    Smoking status: Former Smoker     Packs/day: 0.50     Last attempt to quit: 10/16/2017     Years since quittin.0    Smokeless tobacco: Never Used   Substance Use Topics    Alcohol use: Yes     Comment: socially    Drug use: No     Types: Marijuana     Comment: last used 2012     Prior to Admission Medications   Prescriptions Last Dose Informant Patient Reported? Taking? FLUoxetine (PROZAC) 40 mg capsule   Yes No   PSEUDOEPHEDRINE-guaiFENesin (MUCINEX D MAXIMUM STRENGTH) Tb12 extended release tablet   No No   Sig: Take 1 Tab by mouth every twelve (12) hours. As needed for sinus pressure   fluticasone (FLONASE) 50 mcg/actuation nasal spray Not Taking at Unknown time  Yes No   [place] by Nasal route.    traZODone (DESYREL) 100 mg tablet   Yes No      Facility-Administered Medications: None

## 2019-10-25 NOTE — PROGRESS NOTES
Visited with patient. Demographics on face sheet verified. Patient states no PCP and no insurance. Explained the [address] program in detail and provided patient with resources. Contact information for [facility] [place] with [address] given to patient and patient encouraged to follow up with her as soon as possible. Patient also given direct contact information for the [facility]IN[facility] representative at [facility] and encouraged to call to get screened for Medicaid/Insurance eligibility and/or financial assistance.

## 2019-10-25 NOTE — ED NOTES
I have reviewed discharge instructions with the patient. The patient verbalized understanding. Patient left ED via Discharge Method: ambulatory to Home with family. Opportunity for questions and clarification provided. Patient given 2 scripts. To continue your aftercare when you leave the hospital, you may receive an automated call from our care team to check in on how you are doing. This is a free service and part of our promise to provide the best care and service to meet your aftercare needs.  If you have questions, or wish to unsubscribe from this service please call [phone]. Thank you for Choosing our [facility] Emergency Department.

## 2020-01-10 ENCOUNTER — HOSPITAL ENCOUNTER (EMERGENCY)
Age: 30
Discharge: HOME | End: 2020-01-10
Attending: EMERGENCY MEDICINE
Payer: SELF-PAY

## 2020-01-10 VITALS
TEMPERATURE: 99.2 F | HEART RATE: 95 BPM | SYSTOLIC BLOOD PRESSURE: 122 MMHG | BODY MASS INDEX: 37.07 KG/M2 | DIASTOLIC BLOOD PRESSURE: 72 MMHG | RESPIRATION RATE: 18 BRPM | OXYGEN SATURATION: 96 % | HEIGHT: 67 IN | WEIGHT: 236.2 LBS

## 2020-01-10 DIAGNOSIS — J11.1 FLU SYNDROME: Primary | ICD-10-CM

## 2020-01-10 DIAGNOSIS — R05.9 COUGH: ICD-10-CM

## 2020-01-10 PROCEDURE — 99283 EMERGENCY DEPT VISIT LOW MDM: CPT | Performed by: EMERGENCY MEDICINE

## 2020-01-10 RX ORDER — BENZONATATE 200 MG/1
200 CAPSULE ORAL
Qty: 15 CAP | Refills: 0 | Status: SHIPPED | OUTPATIENT
Start: 2020-01-10 | End: 2020-01-15

## 2020-01-10 RX ORDER — IBUPROFEN 800 MG/1
800 TABLET ORAL
Qty: 15 TAB | Refills: 0 | Status: SHIPPED | OUTPATIENT
Start: 2020-01-10 | End: 2020-01-15

## 2020-01-10 NOTE — LETTER
[facility] 
[address] [facility] [address] [phone] Work/School Note Date: 1/10/2020 To Whom It May concern: 
 
Dolores Pabon was seen and treated today in the emergency room by the following provider(s): 
Attending Provider: Dino Libman, MD. Dolores Pabon may return to work on 1/13. Sincerely, Loyda Gauthier MD

## 2020-01-10 NOTE — ED TRIAGE NOTES
Pt states that this started on Monday and she has been coughing up yellow stuff and pain in chest when she coughs.

## 2020-01-10 NOTE — ED PROVIDER NOTES
31-year-old female previously healthy presents with 4-day history of some fever chills and cough that is productive of some sputum. She had some diarrhea a few days ago. Some soreness in her central chest when she coughs. No wheezing or shortness of breath. The history is provided by the patient. Cough   This is a new problem. The current episode started more than 2 days ago. The problem occurs every few minutes. The problem has not changed since onset. The cough is productive of sputum. Patient reports a subjective fever - was not measured. Associated symptoms include chest pain, chills, sweats, rhinorrhea and myalgias. Pertinent negatives include no ear pain, no headaches, no sore throat, no shortness of breath, no wheezing, no nausea and no vomiting. She has tried nothing for the symptoms. Fever    Associated symptoms include chest pain and cough. Pertinent negatives include no diarrhea, no vomiting, no headaches, no sore throat, no shortness of breath, no neck pain and no rash. Past Medical History:   Diagnosis Date    Psychiatric disorder     depression       Past Surgical History:   Procedure Laterality Date    BREAST SURGERY PROCEDURE UNLISTED      reduction         History reviewed. No pertinent family history. Social History     Socioeconomic History    Marital status: SINGLE     Spouse name: Not on file    Number of children: Not on file    Years of education: Not on file    Highest education level: Not on file   Occupational History    Not on file   Social Needs    Financial resource strain: Not on file    Food insecurity:     Worry: Not on file     Inability: Not on file    Transportation needs:     Medical: Not on file     Non-medical: Not on file   Tobacco Use    Smoking status: Former Smoker     Packs/day: 0.50     Last attempt to quit: 10/16/2017     Years since quittin.2    Smokeless tobacco: Never Used   Substance and Sexual Activity    Alcohol use:  Yes Comment: socially    Drug use: No     Types: Marijuana     Comment: last used 4/17/2012    Sexual activity: Not on file   Lifestyle    Physical activity:     Days per week: Not on file     Minutes per session: Not on file    Stress: Not on file   Relationships    Social connections:     Talks on phone: Not on file     Gets together: Not on file     Attends Denominational service: Not on file     Active member of club or organization: Not on file     Attends meetings of clubs or organizations: Not on file     Relationship status: Not on file    Intimate partner violence:     Fear of current or ex partner: Not on file     Emotionally abused: Not on file     Physically abused: Not on file     Forced sexual activity: Not on file   Other Topics Concern    Not on file   Social History Narrative    Not on file         ALLERGIES: Patient has no known allergies. Review of Systems   Constitutional: Positive for chills and fever. HENT: Positive for rhinorrhea. Negative for ear pain and sore throat. Respiratory: Positive for cough. Negative for shortness of breath and wheezing. Cardiovascular: Positive for chest pain. Negative for palpitations. Gastrointestinal: Negative for abdominal pain, diarrhea, nausea and vomiting. Genitourinary: Negative for dysuria and flank pain. Musculoskeletal: Positive for myalgias. Negative for back pain and neck pain. Skin: Negative for color change and rash. Neurological: Negative for syncope and headaches. All other systems reviewed and are negative. Vitals:    01/10/20 1022   BP: 123/80   Pulse: 95   Resp: 18   Temp: 99.2 °F (37.3 °C)   SpO2: 97%   Height: 5' 7\" (1.702 m)            Physical Exam  Vitals signs and nursing note reviewed. Constitutional:       General: She is not in acute distress. Appearance: She is well-developed. HENT:      Head: Normocephalic and atraumatic.       Right Ear: External ear normal.      Left Ear: External ear normal. Mouth/Throat:      Pharynx: No oropharyngeal exudate. Eyes:      Conjunctiva/sclera: Conjunctivae normal.      Pupils: Pupils are equal, round, and reactive to light. Neck:      Musculoskeletal: Normal range of motion and neck supple. Cardiovascular:      Rate and Rhythm: Normal rate and regular rhythm. Heart sounds: No murmur. Pulmonary:      Effort: No respiratory distress. Breath sounds: Normal breath sounds. Skin:     General: Skin is warm and dry. Neurological:      Mental Status: She is alert and oriented to person, place, and time. Gait: Gait normal.      Comments: Nl speech   Psychiatric:         Speech: Speech normal.          MDM  Number of Diagnoses or Management Options  Diagnosis management comments: Do not believe imaging is needed. Viral infection. Possibly flu. No signs of pneumonia or sepsis.     Risk of Complications, Morbidity, and/or Mortality  Presenting problems: moderate  Diagnostic procedures: minimal  Management options: low    Patient Progress  Patient progress: stable         Procedures

## 2020-01-10 NOTE — DISCHARGE INSTRUCTIONS
Plenty of fluids. May try over the counter cough medication such as Robitussin-DM. Prescription for Tessalon for cough if needed as well. Ibuprofen for aches and pains and fever. Recheck 3 days if not improving. Recheck sooner for extremely high fevers or shortness of breath. Patient Education        Influenza (Flu): Care Instructions  Your Care Instructions    Influenza (flu) is an infection in the lungs and breathing passages. It is caused by the influenza virus. There are different strains, or types, of the flu virus from year to year. Unlike the common cold, the flu comes on suddenly and the symptoms, such as a cough, congestion, fever, chills, fatigue, aches, and pains, are more severe. These symptoms may last up to 10 days. Although the flu can make you feel very sick, it usually doesn't cause serious health problems. Home treatment is usually all you need for flu symptoms. But your doctor may prescribe antiviral medicine to prevent other health problems, such as pneumonia, from developing. Older people and those who have a long-term health condition, such as lung disease, are most at risk for having pneumonia or other health problems. Follow-up care is a key part of your treatment and safety. Be sure to make and go to all appointments, and call your doctor if you are having problems. It's also a good idea to know your test results and keep a list of the medicines you take. How can you care for yourself at home? · Get plenty of rest.  · Drink plenty of fluids, enough so that your urine is light yellow or clear like water. If you have kidney, heart, or liver disease and have to limit fluids, talk with your doctor before you increase the amount of fluids you drink. · Take an over-the-counter pain medicine if needed, such as acetaminophen (Tylenol), ibuprofen (Advil, Motrin), or naproxen (Aleve), to relieve fever, headache, and muscle aches. Read and follow all instructions on the label.  No one younger than 20 should take aspirin. It has been linked to Reye syndrome, a serious illness. · Do not smoke. Smoking can make the flu worse. If you need help quitting, talk to your doctor about stop-smoking programs and medicines. These can increase your chances of quitting for good. · Breathe moist air from a hot shower or from a sink filled with hot water to help clear a stuffy nose. · Before you use cough and cold medicines, check the label. These medicines may not be safe for young children or for people with certain health problems. · If the skin around your nose and lips becomes sore, put some petroleum jelly on the area. · To ease coughing:  ? Drink fluids to soothe a scratchy throat. ? Suck on cough drops or plain hard candy. ? Take an over-the-counter cough medicine that contains dextromethorphan to help you get some sleep. Read and follow all instructions on the label. ? Raise your head at night with an extra pillow. This may help you rest if coughing keeps you awake. · Take any prescribed medicine exactly as directed. Call your doctor if you think you are having a problem with your medicine. To avoid spreading the flu  · Wash your hands regularly, and keep your hands away from your face. · Stay home from school, work, and other public places until you are feeling better and your fever has been gone for at least 24 hours. The fever needs to have gone away on its own without the help of medicine. · Ask people living with you to talk to their doctors about preventing the flu. They may get antiviral medicine to keep from getting the flu from you. · To prevent the flu in the future, get a flu vaccine every fall. Encourage people living with you to get the vaccine. · Cover your mouth when you cough or sneeze. When should you call for help? Call 911 anytime you think you may need emergency care.  For example, call if:    · You have severe trouble breathing.    Call your doctor now or seek immediate medical care if:    · You have new or worse trouble breathing.     · You seem to be getting much sicker.     · You feel very sleepy or confused.     · You have a new or higher fever.     · You get a new rash.    Watch closely for changes in your health, and be sure to contact your doctor if:    · You begin to get better and then get worse.     · You are not getting better after 1 week. Where can you learn more? Go to http://maykel-merissa.info/. Enter P483 in the search box to learn more about \"Influenza (Flu): Care Instructions. \"  Current as of: June 9, 2019  Content Version: 12.2  © 0595-5989 [employer]. Care instructions adapted under license by [employer] (which disclaims liability or warranty for this information). If you have questions about a medical condition or this instruction, always ask your healthcare professional. [address] any warranty or liability for your use of this information.

## 2020-01-10 NOTE — ED NOTES
I have reviewed discharge instructions with the patient. The patient verbalized understanding. Patient left ED via Discharge Method: ambulatory to Home with self. Opportunity for questions and clarification provided. Patient given 2 scripts. To continue your aftercare when you leave the hospital, you may receive an automated call from our care team to check in on how you are doing. This is a free service and part of our promise to provide the best care and service to meet your aftercare needs.  If you have questions, or wish to unsubscribe from this service please call [phone]. Thank you for Choosing our [facility] Emergency Department.

## 2021-08-01 ENCOUNTER — HOSPITAL ENCOUNTER (EMERGENCY)
Age: 31
Discharge: HOME | End: 2021-08-01
Attending: EMERGENCY MEDICINE

## 2021-08-01 VITALS
HEART RATE: 71 BPM | OXYGEN SATURATION: 99 % | HEIGHT: 67 IN | WEIGHT: 236 LBS | DIASTOLIC BLOOD PRESSURE: 80 MMHG | RESPIRATION RATE: 18 BRPM | SYSTOLIC BLOOD PRESSURE: 125 MMHG | BODY MASS INDEX: 37.04 KG/M2 | TEMPERATURE: 100.5 F

## 2021-08-01 DIAGNOSIS — R51.9 NONINTRACTABLE HEADACHE, UNSPECIFIED CHRONICITY PATTERN, UNSPECIFIED HEADACHE TYPE: Primary | ICD-10-CM

## 2021-08-01 DIAGNOSIS — R50.9 FEVER, UNSPECIFIED FEVER CAUSE: ICD-10-CM

## 2021-08-01 LAB
COVID-19 RAPID TEST, COVR: NOT DETECTED
SARS-COV-2, COV2: NORMAL
SOURCE, COVRS: NORMAL

## 2021-08-01 PROCEDURE — 87635 SARS-COV-2 COVID-19 AMP PRB: CPT

## 2021-08-01 PROCEDURE — 74011250636 HC RX REV CODE- 250/636: Performed by: PHYSICIAN ASSISTANT

## 2021-08-01 PROCEDURE — 99282 EMERGENCY DEPT VISIT SF MDM: CPT

## 2021-08-01 PROCEDURE — 96372 THER/PROPH/DIAG INJ SC/IM: CPT

## 2021-08-01 PROCEDURE — 74011250637 HC RX REV CODE- 250/637: Performed by: PHYSICIAN ASSISTANT

## 2021-08-01 RX ORDER — ACETAMINOPHEN 500 MG
1000 TABLET ORAL
Status: COMPLETED | OUTPATIENT
Start: 2021-08-01 | End: 2021-08-01

## 2021-08-01 RX ORDER — KETOROLAC TROMETHAMINE 30 MG/ML
30 INJECTION, SOLUTION INTRAMUSCULAR; INTRAVENOUS
Status: COMPLETED | OUTPATIENT
Start: 2021-08-01 | End: 2021-08-01

## 2021-08-01 RX ADMIN — ACETAMINOPHEN 1000 MG: 500 TABLET, FILM COATED ORAL at 22:50

## 2021-08-01 RX ADMIN — KETOROLAC TROMETHAMINE 30 MG: 30 INJECTION, SOLUTION INTRAMUSCULAR at 22:50

## 2021-08-02 NOTE — ED PROVIDER NOTES
80-year-old female who presents to emergency room with chief complaint of headache that started yesterday. She rates the pain at a 9. She also complains of a fever and chills. She has no other associate symptoms. She states she did not get the Covid vaccine. Denies any specific of her life. Denies onset of a thunderclap. The history is provided by the patient. Headache   This is a new problem. The current episode started yesterday. The headache is aggravated by nothing. The pain is at a severity of 9/10. Associated symptoms include a fever. Pertinent negatives include no shortness of breath, no visual change, no nausea and no vomiting. She has tried nothing for the symptoms. Past Medical History:   Diagnosis Date    Psychiatric disorder     depression       Past Surgical History:   Procedure Laterality Date    BREAST SURGERY PROCEDURE UNLISTED      reduction         No family history on file. Social History     Socioeconomic History    Marital status: SINGLE     Spouse name: Not on file    Number of children: Not on file    Years of education: Not on file    Highest education level: Not on file   Occupational History    Not on file   Tobacco Use    Smoking status: Former Smoker     Packs/day: 0.50     Quit date: 10/16/2017     Years since quitting: 3.7    Smokeless tobacco: Never Used   Substance and Sexual Activity    Alcohol use: Yes     Comment: socially    Drug use: No     Types: Marijuana     Comment: last used 4/17/2012    Sexual activity: Not on file   Other Topics Concern    Not on file   Social History Narrative    Not on file     Social Determinants of Health     Financial Resource Strain:     Difficulty of Paying Living Expenses:    Food Insecurity:     Worried About Running Out of Food in the Last Year:     [address] in the Last Year:    Transportation Needs:     Lack of Transportation (Medical):      Lack of Transportation (Non-Medical):    Physical Activity:  Days of Exercise per Week:     Minutes of Exercise per Session:    Stress:     Feeling of Stress :    Social Connections:     Frequency of Communication with Friends and Family:     Frequency of Social Gatherings with Friends and Family:     Attends Worship Services:     Active Member of Clubs or Organizations:     Attends Club or Organization Meetings:     Marital Status:    Intimate Partner Violence:     Fear of Current or Ex-Partner:     Emotionally Abused:     Physically Abused:     Sexually Abused: ALLERGIES: Patient has no known allergies. Review of Systems   Constitutional: Positive for chills and fever. HENT: Negative for facial swelling. Respiratory: Negative for chest tightness and shortness of breath. Cardiovascular: Negative for chest pain. Gastrointestinal: Negative for abdominal pain, nausea and vomiting. Musculoskeletal: Negative for myalgias. Neurological: Positive for headaches. Psychiatric/Behavioral: Negative for confusion. All other systems reviewed and are negative. Vitals:    08/01/21 2213   BP: 129/89   Pulse: 86   Resp: 18   Temp: (!) 101.3 °F (38.5 °C)   SpO2: 98%   Weight: 107 kg (236 lb)   Height: 5' 7\" (1.702 m)            Physical Exam  Vitals and nursing note reviewed. Constitutional:       Appearance: Normal appearance. HENT:      Head: Normocephalic and atraumatic. Mouth/Throat:      Mouth: Mucous membranes are moist.   Eyes:      Pupils: Pupils are equal, round, and reactive to light. Cardiovascular:      Rate and Rhythm: Normal rate and regular rhythm. Pulmonary:      Effort: No respiratory distress. Breath sounds: Normal breath sounds. Abdominal:      Palpations: Abdomen is soft. Tenderness: There is no abdominal tenderness. There is no guarding. Skin:     General: Skin is warm and dry. Neurological:      Mental Status: She is alert and oriented to person, place, and time.  Mental status is at baseline. Psychiatric:         Mood and Affect: Mood normal.          MDM  Number of Diagnoses or Management Options  Diagnosis management comments: Headache relieved here in the emergency room after 1000 mg of Tylenol. Fever has also come down substantially. Rapid Covid negative. Patient be discharged home with instructions to continue Tylenol every 8 hours, return to emergency room if you develop severe abdominal pain, shortness of breath, chest pain or cough.        Amount and/or Complexity of Data Reviewed  Clinical lab tests: ordered and reviewed    Risk of Complications, Morbidity, and/or Mortality  Presenting problems: low  Diagnostic procedures: low  Management options: low           Procedures

## 2021-08-02 NOTE — ED NOTES

## 2021-08-04 ENCOUNTER — HOSPITAL ENCOUNTER (EMERGENCY)
Age: 31
Discharge: HOME | End: 2021-08-04
Attending: EMERGENCY MEDICINE

## 2021-08-04 ENCOUNTER — APPOINTMENT (OUTPATIENT)
Dept: GENERAL RADIOLOGY | Age: 31
End: 2021-08-04
Attending: EMERGENCY MEDICINE

## 2021-08-04 VITALS
OXYGEN SATURATION: 99 % | WEIGHT: 268 LBS | HEIGHT: 67 IN | SYSTOLIC BLOOD PRESSURE: 128 MMHG | TEMPERATURE: 98.9 F | HEART RATE: 90 BPM | BODY MASS INDEX: 42.06 KG/M2 | DIASTOLIC BLOOD PRESSURE: 77 MMHG | RESPIRATION RATE: 19 BRPM

## 2021-08-04 DIAGNOSIS — B34.9 VIRAL SYNDROME: Primary | ICD-10-CM

## 2021-08-04 DIAGNOSIS — R51.9 FRONTAL HEADACHE: ICD-10-CM

## 2021-08-04 DIAGNOSIS — J06.9 ACUTE URI: ICD-10-CM

## 2021-08-04 LAB
ALBUMIN SERPL-MCNC: 3.6 G/DL (ref 3.5–5)
ALBUMIN/GLOB SERPL: 0.7 {RATIO} (ref 1.2–3.5)
ALP SERPL-CCNC: 91 U/L (ref 50–136)
ALT SERPL-CCNC: 42 U/L (ref 12–65)
ANION GAP SERPL CALC-SCNC: 6 MMOL/L (ref 7–16)
AST SERPL-CCNC: 42 U/L (ref 15–37)
BASOPHILS # BLD: 0.1 K/UL (ref 0–0.2)
BASOPHILS NFR BLD: 1 % (ref 0–2)
BILIRUB SERPL-MCNC: 0.3 MG/DL (ref 0.2–1.1)
BUN SERPL-MCNC: 10 MG/DL (ref 6–23)
CALCIUM SERPL-MCNC: 9.4 MG/DL (ref 8.3–10.4)
CHLORIDE SERPL-SCNC: 100 MMOL/L (ref 98–107)
CO2 SERPL-SCNC: 27 MMOL/L (ref 21–32)
CREAT SERPL-MCNC: 0.78 MG/DL (ref 0.6–1)
DIFFERENTIAL METHOD BLD: ABNORMAL
EOSINOPHIL # BLD: 0.1 K/UL (ref 0–0.8)
EOSINOPHIL NFR BLD: 1 % (ref 0.5–7.8)
ERYTHROCYTE [DISTWIDTH] IN BLOOD BY AUTOMATED COUNT: 15.9 % (ref 11.9–14.6)
GLOBULIN SER CALC-MCNC: 5.4 G/DL (ref 2.3–3.5)
GLUCOSE SERPL-MCNC: 88 MG/DL (ref 65–100)
HCG UR QL: NEGATIVE
HCT VFR BLD AUTO: 38.7 % (ref 35.8–46.3)
HGB BLD-MCNC: 12 G/DL (ref 11.7–15.4)
IMM GRANULOCYTES # BLD AUTO: 0 K/UL (ref 0–0.5)
IMM GRANULOCYTES NFR BLD AUTO: 0 % (ref 0–5)
LYMPHOCYTES # BLD: 3.4 K/UL (ref 0.5–4.6)
LYMPHOCYTES NFR BLD: 54 % (ref 13–44)
MAGNESIUM SERPL-MCNC: 2.2 MG/DL (ref 1.8–2.4)
MCH RBC QN AUTO: 24.5 PG (ref 26.1–32.9)
MCHC RBC AUTO-ENTMCNC: 31 G/DL (ref 31.4–35)
MCV RBC AUTO: 79.1 FL (ref 79.6–97.8)
MONOCYTES # BLD: 0.4 K/UL (ref 0.1–1.3)
MONOCYTES NFR BLD: 6 % (ref 4–12)
NEUTS SEG # BLD: 2.5 K/UL (ref 1.7–8.2)
NEUTS SEG NFR BLD: 38 % (ref 43–78)
NRBC # BLD: 0 K/UL (ref 0–0.2)
PLATELET # BLD AUTO: 336 K/UL (ref 150–450)
PLATELET COMMENTS,PCOM: ADEQUATE
PMV BLD AUTO: 9.7 FL (ref 9.4–12.3)
POTASSIUM SERPL-SCNC: 4.7 MMOL/L (ref 3.5–5.1)
PROT SERPL-MCNC: 9 G/DL (ref 6.3–8.2)
RBC # BLD AUTO: 4.89 M/UL (ref 4.05–5.2)
RBC MORPH BLD: ABNORMAL
RBC MORPH BLD: ABNORMAL
SARS-COV-2, COV2: NORMAL
SODIUM SERPL-SCNC: 133 MMOL/L (ref 136–145)
STREP,MOLECULAR STRPM: ABNORMAL
WBC # BLD AUTO: 6.5 K/UL (ref 4.3–11.1)
WBC MORPH BLD: ABNORMAL

## 2021-08-04 PROCEDURE — 96361 HYDRATE IV INFUSION ADD-ON: CPT

## 2021-08-04 PROCEDURE — 81003 URINALYSIS AUTO W/O SCOPE: CPT

## 2021-08-04 PROCEDURE — 74011250637 HC RX REV CODE- 250/637: Performed by: EMERGENCY MEDICINE

## 2021-08-04 PROCEDURE — 87651 STREP A DNA AMP PROBE: CPT

## 2021-08-04 PROCEDURE — 96374 THER/PROPH/DIAG INJ IV PUSH: CPT

## 2021-08-04 PROCEDURE — 96375 TX/PRO/DX INJ NEW DRUG ADDON: CPT

## 2021-08-04 PROCEDURE — 71046 X-RAY EXAM CHEST 2 VIEWS: CPT

## 2021-08-04 PROCEDURE — 99284 EMERGENCY DEPT VISIT MOD MDM: CPT

## 2021-08-04 PROCEDURE — 81025 URINE PREGNANCY TEST: CPT

## 2021-08-04 PROCEDURE — 85025 COMPLETE CBC W/AUTO DIFF WBC: CPT

## 2021-08-04 PROCEDURE — 83735 ASSAY OF MAGNESIUM: CPT

## 2021-08-04 PROCEDURE — U0005 INFEC AGEN DETEC AMPLI PROBE: HCPCS

## 2021-08-04 PROCEDURE — 74011250636 HC RX REV CODE- 250/636: Performed by: EMERGENCY MEDICINE

## 2021-08-04 PROCEDURE — 80053 COMPREHEN METABOLIC PANEL: CPT

## 2021-08-04 RX ORDER — SODIUM CHLORIDE, SODIUM LACTATE, POTASSIUM CHLORIDE, CALCIUM CHLORIDE 600; 310; 30; 20 MG/100ML; MG/100ML; MG/100ML; MG/100ML
1000 INJECTION, SOLUTION INTRAVENOUS ONCE
Status: COMPLETED | OUTPATIENT
Start: 2021-08-04 | End: 2021-08-04

## 2021-08-04 RX ORDER — BUTALBITAL, ACETAMINOPHEN AND CAFFEINE 50; 325; 40 MG/1; MG/1; MG/1
1 TABLET ORAL
Qty: 10 TABLET | Refills: 0 | Status: SHIPPED | OUTPATIENT
Start: 2021-08-04

## 2021-08-04 RX ORDER — SULFAMETHOXAZOLE AND TRIMETHOPRIM 800; 160 MG/1; MG/1
1 TABLET ORAL 2 TIMES DAILY
Qty: 14 TABLET | Refills: 0 | Status: SHIPPED | OUTPATIENT
Start: 2021-08-04

## 2021-08-04 RX ORDER — IBUPROFEN 800 MG/1
800 TABLET ORAL
Qty: 15 TABLET | Refills: 0 | Status: SHIPPED | OUTPATIENT
Start: 2021-08-04 | End: 2021-08-09

## 2021-08-04 RX ORDER — ONDANSETRON 2 MG/ML
4 INJECTION INTRAMUSCULAR; INTRAVENOUS
Status: COMPLETED | OUTPATIENT
Start: 2021-08-04 | End: 2021-08-04

## 2021-08-04 RX ORDER — KETOROLAC TROMETHAMINE 30 MG/ML
30 INJECTION, SOLUTION INTRAMUSCULAR; INTRAVENOUS
Status: COMPLETED | OUTPATIENT
Start: 2021-08-04 | End: 2021-08-04

## 2021-08-04 RX ORDER — ACETAMINOPHEN 500 MG
1000 TABLET ORAL
Status: COMPLETED | OUTPATIENT
Start: 2021-08-04 | End: 2021-08-04

## 2021-08-04 RX ADMIN — KETOROLAC TROMETHAMINE 30 MG: 30 INJECTION, SOLUTION INTRAMUSCULAR; INTRAVENOUS at 21:04

## 2021-08-04 RX ADMIN — ONDANSETRON 4 MG: 2 INJECTION INTRAMUSCULAR; INTRAVENOUS at 21:04

## 2021-08-04 RX ADMIN — ACETAMINOPHEN 1000 MG: 500 TABLET, FILM COATED ORAL at 21:04

## 2021-08-04 RX ADMIN — SODIUM CHLORIDE, SODIUM LACTATE, POTASSIUM CHLORIDE, AND CALCIUM CHLORIDE 1000 ML: 600; 310; 30; 20 INJECTION, SOLUTION INTRAVENOUS at 21:05

## 2021-08-04 NOTE — ED TRIAGE NOTES
Pt was seen here on Sunday for the same. Pt with headache, sore throat and fever. Pt also says she has a \"bite\" on her right shoulder.

## 2021-08-04 NOTE — LETTER
[address] [address] EMERGENCY DEPT  [address] [facility] [address] [phone]    Work/School Note    Date: 8/4/2021    To Whom It May concern:    Shaggy Donis was seen and treated today in the emergency room by the following provider(s):  Attending Provider: Yue Blunt MD.      Shaggy Donis may return to work on 8/9.     Sincerely,          Skye Antonio MD

## 2021-08-05 LAB
SARS-COV-2, COV2: NOT DETECTED
SPECIMEN SOURCE, FCOV2M: NORMAL

## 2021-08-05 NOTE — DISCHARGE INSTRUCTIONS
Drink plenty of fluids. Tylenol or prescription ibuprofen for aches and fever. Headache medication if needed. Take antibiotic until complete. If right shoulder wound enlarges or drains more, recheck. Also recheck for stiff neck, persistent vomiting or worsening headache.

## 2021-08-05 NOTE — ED PROVIDER NOTES
68-year-old female complains of a frontal headache along with fever and chills up to 101.9 over the last 5 days. Slight URI symptoms and cough. Right throat pain with swallowing but no hoarseness or drooling. Minimal photophobia. Complains of a brighter red area on her right shoulder that has some drainage. No vomiting or diarrhea. No discharge. No dysuria. No stiff neck. Slightly itchy rash. Past surgical history negative past medical history negative. Patient was seen here, reviewing old records. Seen August 1. Had negative Covid swab at that time. At that time she described headache along with fever chills and myalgias. Patient without dysuria. The history is provided by the patient. Headache   This is a new problem. The current episode started more than 2 days ago. The problem occurs constantly. The problem has not changed since onset. The headache is aggravated by bright light. The pain is located in the frontal region. The quality of the pain is described as dull. The pain is moderate. Associated symptoms include a fever and nausea. Pertinent negatives include no chest pressure, no palpitations, no syncope, no shortness of breath, no weakness, no dizziness, no visual change and no vomiting. She has tried acetaminophen for the symptoms. Fever   Associated symptoms include headaches. Pertinent negatives include no chest pain, no vomiting, no sore throat, no cough, no shortness of breath, no neck pain and no rash. Past Medical History:   Diagnosis Date    Psychiatric disorder     depression       Past Surgical History:   Procedure Laterality Date    [place] BREAST SURGERY PROCEDURE UNLISTED      reduction         History reviewed. No pertinent family history.     Social History     Socioeconomic History    Marital status: SINGLE     Spouse name: Not on file    Number of children: Not on file    Years of education: Not on file    Highest education level: Not on file   Occupational History    Not on file   Tobacco Use    Smoking status: Former Smoker     Packs/day: 0.50     Quit date: 10/16/2017     Years since quitting: 3.8    Smokeless tobacco: Never Used   Substance and Sexual Activity    Alcohol use: Yes     Comment: socially    Drug use: No     Types: Marijuana     Comment: last used 4/17/2012    Sexual activity: Not on file   Other Topics Concern    Not on file   Social History Narrative    Not on file     Social Determinants of Health     Financial Resource Strain:     Difficulty of Paying Living Expenses:    Food Insecurity:     Worried About Running Out of Food in the Last Year:     [address] in the Last Year:    Transportation Needs:     Lack of Transportation (Medical):  Lack of Transportation (Non-Medical):    Physical Activity:     Days of Exercise per Week:     Minutes of Exercise per Session:    Stress:     Feeling of Stress :    Social Connections:     Frequency of Communication with Friends and Family:     Frequency of Social Gatherings with Friends and Family:     Attends Shinto Services:     Active Member of Clubs or Organizations:     Attends Club or Organization Meetings:     Marital Status:    Intimate Partner Violence:     Fear of Current or Ex-Partner:     Emotionally Abused:     Physically Abused:     Sexually Abused: ALLERGIES: Patient has no known allergies. Review of Systems   Constitutional: Positive for fever. Negative for chills. HENT: Negative for ear pain, sinus pressure and sore throat. Eyes: Negative for pain and visual disturbance. Respiratory: Negative for cough and shortness of breath. Cardiovascular: Negative for chest pain, palpitations and syncope. Gastrointestinal: Positive for nausea. Negative for vomiting. Musculoskeletal: Negative for back pain, gait problem, myalgias, neck pain and neck stiffness. Skin: Negative for color change and rash. Neurological: Positive for headaches. Negative for dizziness, syncope, weakness and numbness. All other systems reviewed and are negative. Vitals:    08/04/21 1834   BP: 126/87   Pulse: 93   Resp: 18   Temp: 100.2 °F (37.9 °C)   SpO2: 100%   Weight: 121.6 kg (268 lb)   Height: 5' 7\" (1.702 m)            Physical Exam  Vitals and nursing note reviewed. Constitutional:       General: She is not in acute distress. Appearance: She is well-developed. HENT:      Head: Normocephalic and atraumatic. Right Ear: External ear normal.      Left Ear: External ear normal.      Mouth/Throat:      Pharynx: No oropharyngeal exudate. Eyes:      Conjunctiva/sclera: Conjunctivae normal.      Pupils: Pupils are equal, round, and reactive to light. Cardiovascular:      Rate and Rhythm: Normal rate and regular rhythm. Heart sounds: No murmur heard. Pulmonary:      Effort: No respiratory distress. Breath sounds: Normal breath sounds. Abdominal:      General: Bowel sounds are normal.      Palpations: Abdomen is soft. There is no mass. Tenderness: There is no abdominal tenderness. There is no guarding or rebound. Hernia: No hernia is present. Musculoskeletal:      Cervical back: Normal range of motion and neck supple. No rigidity. Skin:     General: Skin is warm and dry. Neurological:      Mental Status: She is alert and oriented to person, place, and time. Gait: Gait normal.      Comments: Nl speech    No photophobia. Neck supple. normal speech and gait   Psychiatric:         Speech: Speech normal.          MDM  Number of Diagnoses or Management Options  Diagnosis management comments: Patient without vomiting, significant photophobia or stiff neck. Believe chance of bacterial meningitis is low but not 0. Discussed with patient lumbar puncture. Explained risks and benefits. She declines. Check chest x-ray due to cough and fever. Check urinalysis. Screening blood work. IV fluids and pain control. Amount and/or Complexity of Data Reviewed  Clinical lab tests: ordered and reviewed  Tests in the radiology section of CPT®: ordered and reviewed  Review and summarize past medical records: yes (Recent ED visit with negative Covid test.)    Risk of Complications, Morbidity, and/or Mortality  Presenting problems: moderate  Diagnostic procedures: minimal  Management options: low    Patient Progress  Patient progress: stable         Procedures      Results Include:    Recent Results (from the past 24 hour(s))   CBC WITH AUTOMATED DIFF    Collection Time: 08/04/21  7:29 PM   Result Value Ref Range    WBC 6.5 4.3 - 11.1 K/uL    RBC 4.89 4.05 - 5.2 M/uL    HGB 12.0 11.7 - 15.4 g/dL    HCT 38.7 35.8 - 46.3 %    MCV 79.1 (L) 79.6 - 97.8 FL    MCH 24.5 (L) 26.1 - 32.9 PG    MCHC 31.0 (L) 31.4 - 35.0 g/dL    RDW 15.9 (H) 11.9 - 14.6 %    PLATELET 934 433 - 585 K/uL    MPV 9.7 9.4 - 12.3 FL    ABSOLUTE NRBC 0.00 0.0 - 0.2 K/uL    NEUTROPHILS 38 (L) 43 - 78 %    LYMPHOCYTES 54 (H) 13 - 44 %    MONOCYTES 6 4.0 - 12.0 %    EOSINOPHILS 1 0.5 - 7.8 %    BASOPHILS 1 0.0 - 2.0 %    IMMATURE GRANULOCYTES 0 0.0 - 5.0 %    ABS. NEUTROPHILS 2.5 1.7 - 8.2 K/UL    ABS. LYMPHOCYTES 3.4 0.5 - 4.6 K/UL    ABS. MONOCYTES 0.4 0.1 - 1.3 K/UL    ABS. EOSINOPHILS 0.1 0.0 - 0.8 K/UL    ABS. BASOPHILS 0.1 0.0 - 0.2 K/UL    ABS. IMM.  GRANS. 0.0 0.0 - 0.5 K/UL    RBC COMMENTS SLIGHT  ANISOCYTOSIS + POIKILOCYTOSIS        RBC COMMENTS OCCASIONAL  MICROCYTOSIS        WBC COMMENTS Result Confirmed By Smear      PLATELET COMMENTS ADEQUATE      DF AUTOMATED     METABOLIC PANEL, COMPREHENSIVE    Collection Time: 08/04/21  7:29 PM   Result Value Ref Range    Sodium 133 (L) 136 - 145 mmol/L    Potassium 4.7 3.5 - 5.1 mmol/L    Chloride 100 98 - 107 mmol/L    CO2 27 21 - 32 mmol/L    Anion gap 6 (L) 7 - 16 mmol/L    Glucose 88 65 - 100 mg/dL    BUN 10 6 - 23 MG/DL    Creatinine 0.78 0.6 - 1.0 MG/DL    GFR est AA >60 >60 ml/min/1.73m2    GFR est non-AA >60 >60 ml/min/1.73m2    Calcium 9.4 8.3 - 10.4 MG/DL    Bilirubin, total 0.3 0.2 - 1.1 MG/DL    ALT (SGPT) 42 12 - 65 U/L    AST (SGOT) 42 (H) 15 - 37 U/L    Alk. phosphatase 91 50 - 136 U/L    Protein, total 9.0 (H) 6.3 - 8.2 g/dL    Albumin 3.6 3.5 - 5.0 g/dL    Globulin 5.4 (H) 2.3 - 3.5 g/dL    A-G Ratio 0.7 (L) 1.2 - 3.5     MAGNESIUM    Collection Time: 08/04/21  7:29 PM   Result Value Ref Range    Magnesium 2.2 1.8 - 2.4 mg/dL   HCG URINE, QL. - POC    Collection Time: 08/04/21  8:31 PM   Result Value Ref Range    Pregnancy test,urine (POC) Negative NEG         Chest x-ray negative per my interpretation. Suspect viral syndrome. Patient does have resolving furuncle on the right shoulder. Will need antibiotics. No results found.

## 2021-08-05 NOTE — ED NOTES
I have reviewed discharge instructions with the patient. The patient verbalized understanding. Patient left ED via Discharge Method: ambulatory to Home with friend. Opportunity for questions and clarification provided. Patient given 3 scripts. To continue your aftercare when you leave the hospital, you may receive an automated call from our care team to check in on how you are doing. This is a free service and part of our promise to provide the best care and service to meet your aftercare needs.  If you have questions, or wish to unsubscribe from this service please call [phone]. Thank you for Choosing our [facility] Emergency Department.

## 2022-05-09 NOTE — ED NOTES
I have reviewed discharge instructions with the patient and her grandmother. The patient and her grandmother verbalized understanding. Patient left ED via Discharge Method: ambulatory to Home with grandmother. Opportunity for questions and clarification provided. Patient given 0 scripts. Patient is up to date with labs and appointments. Medication refilled per standing orders.

## 2023-01-12 ENCOUNTER — OFFICE VISIT (OUTPATIENT)
Dept: OBGYN CLINIC | Age: 33
End: 2023-01-12
Payer: COMMERCIAL

## 2023-01-12 VITALS — WEIGHT: 253 LBS | BODY MASS INDEX: 39.63 KG/M2 | DIASTOLIC BLOOD PRESSURE: 80 MMHG | SYSTOLIC BLOOD PRESSURE: 110 MMHG

## 2023-01-12 DIAGNOSIS — E07.9 ASYMMETRICAL THYROID: ICD-10-CM

## 2023-01-12 DIAGNOSIS — D22.5 NEVUS OF FEMALE BREAST: ICD-10-CM

## 2023-01-12 DIAGNOSIS — D22.9 SUSPICIOUS NEVUS: ICD-10-CM

## 2023-01-12 DIAGNOSIS — Z01.419 ENCOUNTER FOR WELL WOMAN EXAM WITH ROUTINE GYNECOLOGICAL EXAM: Primary | ICD-10-CM

## 2023-01-12 PROCEDURE — 99385 PREV VISIT NEW AGE 18-39: CPT | Performed by: OBSTETRICS & GYNECOLOGY

## 2023-01-12 NOTE — PROGRESS NOTES
Annual Exam  New pt        Yady Rajput   32 y.o.  1990  [id number]    Today:  2023    Patient requests:   well woman annual exam.  Reports: Regular menses with dysmenorrhea    Past Medical History:   Diagnosis Date    Psychiatric disorder     depression       Past Surgical History:   Procedure Laterality Date    BREAST SURGERY      reduction       Family History   Problem Relation Age of Onset    Heart Disease Maternal Grandmother     Diabetes Paternal Grandfather     Prostate Cancer Paternal Grandfather     Breast Cancer Neg Hx     Colon Cancer Neg Hx     Ovarian Cancer Neg Hx     Deep Vein Thrombosis Neg Hx     Pulmonary Embolism Neg Hx        OB History   No obstetric history on file.       Social History     Socioeconomic History    Marital status: Single     Spouse name: None    Number of children: None    Years of education: None    Highest education level: None   Tobacco Use    Smoking status: Former     Packs/day: 0.50     Types: Cigarettes     Quit date: 10/16/2017     Years since quittin.2    Smokeless tobacco: Never   Substance and Sexual Activity    Alcohol use: Yes    Drug use: No     Types: Marijuana (Weed)   Social History Narrative    \"Raymundo-shanelle\"       There are no problems to display for this patient.       Current Outpatient Medications   Medication Sig    butalbital-acetaminophen-caffeine (FIORICET, ESGIC) -40 MG per tablet Take 1 tablet by mouth every 6 hours as needed (Patient not taking: Reported on 2023)    fluticasone (FLONASE) 50 MCG/ACT nasal spray 1 spray by Nasal route (Patient not taking: Reported on 2023)    pseudoephedrine-guaiFENesin 120-1200 MG TB12 Take 1 tablet by mouth every 12 hours (Patient not taking: Reported on 2023)    sulfamethoxazole-trimethoprim (BACTRIM DS;SEPTRA DS) 800-160 MG per tablet Take 1 tablet by mouth 2 times daily (Patient not taking: Reported on 2023)     No current facility-administered medications for  this visit. Review of Systems    Updated from patient's \"Review of Systems\" form that patient completed. Physical Exam:   /80   Wt 253 lb (114.8 kg)   LMP 01/09/2023 (Exact Date)   BMI 39.63 kg/m² , Patient's last menstrual period was 01/09/2023 (exact date). Patient is a 28 y.o. female who appears pleasant, in no apparent distress, her given age, well developed, well nourished and with good attention to hygiene and body habitus. Oriented to person, place and time. Mood and affect normal and appropriate to situation. Head is normocephalic, atraumatic, without any gross head or neck masses. Neck: Neck exam reveals no abnormalities. Thyroid ~ 2 cm right side > left, no abnormalities. Lymph nodes:   Neck lymph nodes are normal.   No supraclavicular lymphadenopathy noted. No axillary lymphadenopathy noted. No groin lymphadenopathy noted. Respiratory: Assessment of respiratory effort reveals even and non labored respirations. Lungs CTA. Cardiovascular: Heart auscultation reveals no murmurs, gallop, rubs or clicks. Skin: No skin rash, abnormal appearing nevi, subcutaneous nodules, lesions or ulcers observed. Abdomen: Abdomen soft, non tender, without palpable masses. Palpation of liver reveals no abnormalities with respect to size, tenderness or masses. Palpation of spleen reveals no abnormalities with respect to size, tenderness or masses. Breast: Symmetrical, no: dimpling, retractions, adenopathy, dominant masses or nipple discharge elicited. Breasts show no palpable masses or tenderness. Bilateral Fibrocystic change is:  moderate-marked   Left breast 10:00--> black nevus  No changes suspicious for malignancy      Genitourinary:  External genitalia are normal in appearance. Examination of urethral meatus reveals location normal and size normal.   Examination of urethra shows no abnormalities. Examination of vaginal vault reveals no abnormalities.  Moderate menstrual blood in the vagina w/ a clot at the external os. Cervix: shows no pathology. Uterus:  Normal size, shape and contour. Suboptimal bimanual exam secondary to BMI    Adnexa and parametria show no masses, tenderness, organomegaly or nodularity. Suboptimal bimanual exam secondary to BMI    Examination of anus and perineum shows no abnormalities. Rectal exam reveals normal sphincter tone without presence of hemorrhoids or masses. ASSESSMENT and PLAN    1.  AE. Importance of self breast exam reviewed, pt educated how to perform SBE.      2.  Her PCP is Dr. Mayte Almaguer,  follows her labs & manages her vaccines. HM:  GHP and vit D. Patient encouraged to establish care with a PCP, a list of PCPs was given. 3.  Covid vacc is current. Declined the flu vaccine. Tdap vaccine status unknown. 4. No h/o abnormal paps. Patient requests pap today. 4. Thyroid ~ 2 cm right side > left, suboptimal exam , BMI 40    5. Dysmenorrhea --> US    6. Left breast 10:00--> black nevus--> dermatologist    7. Use the long narrow speculum ~ 15 cm long. Too much bleeding to collect pap today, f/u 6 wk w/ pap     Re ROS--> non gynecologic concerns referred back to her PCP or appropriate specialist.     Wt Readings from Last 3 Encounters: Wt Readings from Last 3 Encounters:   01/12/23 253 lb (114.8 kg)       BP Readings from Last 3 Encounters:  BP Readings from Last 3 Encounters:   01/12/23 110/80         Diagnosis Orders   1. Encounter for well woman exam with routine gynecological exam        2. Asymmetrical thyroid  US HEAD NECK SOFT TISSUE THYROID      3. BMI 40.0-44.9, adult [facility])            Dictated using voice recognition software.   Proofread but unrecognized errors may exist.    Signed by:  Thomas Peralta MD, Radha Lomeli

## 2023-02-21 ENCOUNTER — OFFICE VISIT (OUTPATIENT)
Dept: OBGYN CLINIC | Age: 33
End: 2023-02-21

## 2023-02-21 VITALS
HEIGHT: 67 IN | BODY MASS INDEX: 32.18 KG/M2 | DIASTOLIC BLOOD PRESSURE: 80 MMHG | SYSTOLIC BLOOD PRESSURE: 124 MMHG | WEIGHT: 205 LBS

## 2023-02-21 DIAGNOSIS — R93.89 ENDOMETRIAL THICKENING ON ULTRASOUND: ICD-10-CM

## 2023-02-21 DIAGNOSIS — N94.6 DYSMENORRHEA: ICD-10-CM

## 2023-02-21 DIAGNOSIS — Z01.419 PAP SMEAR, AS PART OF ROUTINE GYNECOLOGICAL EXAMINATION: ICD-10-CM

## 2023-02-21 DIAGNOSIS — Z12.4 SCREENING FOR CERVICAL CANCER: Primary | ICD-10-CM

## 2023-02-21 DIAGNOSIS — N92.0 MENORRHAGIA WITH REGULAR CYCLE: ICD-10-CM

## 2023-02-21 SDOH — ECONOMIC STABILITY: FOOD INSECURITY: WITHIN THE PAST 12 MONTHS, YOU WORRIED THAT YOUR FOOD WOULD RUN OUT BEFORE YOU GOT MONEY TO BUY MORE.: SOMETIMES TRUE

## 2023-02-21 SDOH — ECONOMIC STABILITY: HOUSING INSECURITY
IN THE LAST 12 MONTHS, WAS THERE A TIME WHEN YOU DID NOT HAVE A STEADY PLACE TO SLEEP OR SLEPT IN A SHELTER (INCLUDING NOW)?: NO

## 2023-02-21 SDOH — ECONOMIC STABILITY: FOOD INSECURITY: WITHIN THE PAST 12 MONTHS, THE FOOD YOU BOUGHT JUST DIDN'T LAST AND YOU DIDN'T HAVE MONEY TO GET MORE.: SOMETIMES TRUE

## 2023-02-21 SDOH — ECONOMIC STABILITY: INCOME INSECURITY: HOW HARD IS IT FOR YOU TO PAY FOR THE VERY BASICS LIKE FOOD, HOUSING, MEDICAL CARE, AND HEATING?: SOMEWHAT HARD

## 2023-02-21 NOTE — PROGRESS NOTES
Mc Figueroa  28 y.o.  1990  [id number]    Today:  23      Chief Complaint   Patient presents with    Gynecologic Exam     Pap smear today, last ov menses too heavy to collect     Subjective:  Mc Figueroa is a 28 y.o. G0, presents today for US:     2023 US today:  ENLARGED, HETEROGENOUS UT, , vol 137 (TILTS POSTERIORLY). ENDOMETRIUM IS IRREGULARLY, THICKENED TO 17.8MM (CD # 17) AND AVASCULAR.  1 SMALL. FUNDAL (POSTERIOR) INTRAMURAL FIBROID. 1.3CM. RT OV- WNL. LT OV- SLIGHLTY COLLAPSED, CYST. 2.4 X 1.5 X 1.8CM. MIN. FF    Reports her last period was heavy and painful, lasted ~ 5 days, required double products. Patient's last menstrual period was 2023 (exact date). OB History    Para Term  AB Living   0 0 0 0 0 0   SAB IAB Ectopic Molar Multiple Live Births   0 0 0 0 0 0     No Known Allergies    No current outpatient medications on file. No current facility-administered medications for this visit.        Past Medical History:   Diagnosis Date    Psychiatric disorder     depression       Past Surgical History:   Procedure Laterality Date    BREAST SURGERY      reduction       Social History     Socioeconomic History    Marital status: Single     Spouse name: None    Number of children: None    Years of education: None    Highest education level: None   Tobacco Use    Smoking status: Former     Packs/day: 0.50     Types: Cigarettes     Quit date: 10/16/2017     Years since quittin.3    Smokeless tobacco: Never   Substance and Sexual Activity    Alcohol use: Yes    Drug use: No     Types: Marijuana [place])   Social History Narrative    \"Raymundo-shanelle\"     Social Determinants of Health     Financial Resource Strain: Medium Risk    Difficulty of Paying Living Expenses: Somewhat hard   Food Insecurity: Food Insecurity Present    Worried About [address] in the Last Year: Sometimes true    Ran Out of Food in the Last Year: Sometimes true Transportation Needs: Unknown    Lack of Transportation (Non-Medical): No   Housing Stability: Unknown    Unstable Housing in the Last Year: No       Family History   Problem Relation Age of Onset    Heart Disease Maternal Grandmother     Diabetes Paternal Grandfather     Prostate Cancer Paternal Grandfather     Breast Cancer Neg Hx     Colon Cancer Neg Hx     Ovarian Cancer Neg Hx     Deep Vein Thrombosis Neg Hx     Pulmonary Embolism Neg Hx      Review of Systems    Objective: /80   Ht 5' 7\" (1.702 m)   Wt 205 lb (93 kg)   LMP 02/05/2023 (Exact Date)   BMI 32.11 kg/m²   Evelyn Moreno is a 28 y.o. female who appears pleasant, well developed, well nourished, with good attention to hygiene and body habitus. In no acute distress. Psych:  Oriented to person, place and time. Mood and affect are normal and appropriate to the situation. Short-term memory and understanding intact    Eyes: Sclera are clear. Conjunctiva and lids within normal limits. No icterus. Ears and Nose: no gross deformities to visual inspection, gross hearing intact   Breasts:  left breast black nevus adjacent to the nipple      Physical Exam  Chest:            Abdomen: Soft. No masses, nontender, no guarding or rebound  External genitalia: normal appearing, minimal erythema, no lesions  Vagina: pink with normal rugations, no lesions  Cervix: normal appearing, no lesions,  no exudate, no CMT. Pap collected  Bimanual and rectal exam deferred ( US today)    A/P:    1.  US today for dysmenorrhea & menorrhagia. Tx options reviewed:  mirena, OCs, depo provera & nexplanon. Lysteda for cycle control/menorrhagia  Will consider/let me know. Endometrium irregular, thickened likely secondary to CD # 17. Repeat US on CD 7-11 (call the office on CD 1-3 to schedule). 2.  Thyroid today ~ 2 cm felt more symmetric, suboptimal exam , BMI 40  LV, thyroid US ordered/advised -->pt was a no show for that appt.   Encouraged to reschedule     1/12/2023 ov: Thyroid ~ 2 cm right side > left, no abnormalities--->thyroid US    3. Left breast dark nevus, referred to Dr. Matthias Quintana for eval.  Pt strongly encouraged to see Dr. Sandra Mckee for her opinion    4. Undecided re childbearing, wants to maintain her options. Follow up:   Return if symptoms worsen or fail to improve, for 1-2 m w/ f/u US on CDs 7-11. Re ROS--> non gynecologic concerns referred back to her PCP or appropriate specialist.       ICD-10-CM    1. Screening for cervical cancer  Z12.4 PAP IG, Aptima HPV and rfx 16/18,45 ([id number])     PAP IG, Aptima HPV and rfx 16/18,45 ([id number])      2. Pap smear, as part of routine gynecological examination  Z01.419 PAP IG, Aptima HPV and rfx 16/18,45 ([id number])     PAP IG, Aptima HPV and rfx 16/18,45 ([id number])      3. Dysmenorrhea  N94.6 AMB POC US, TRANSVAGINAL      4. Menorrhagia with regular cycle  N92.0       5. Endometrial thickening on ultrasound  R93.89            Orders Placed This Encounter   Procedures    AMB POC US, TRANSVAGINAL    PAP IG, Aptima HPV and rfx 16/18,45 ([id number])    HPV, Samuel High-Risk/16/18       More than 50% of this 30-35+ minute visit was spent addressing the above patient concerns including:  assessment, chart review, physical exam and counseling the patient about the above concerns including evaluation plan and treatment strategies. Long conversation with patient, all her questions answered and addressed all her concerns.     Tete Gomez MD, Blake Villaesñor

## 2023-07-13 DIAGNOSIS — N92.0 MENORRHAGIA WITH REGULAR CYCLE: ICD-10-CM

## 2023-07-13 DIAGNOSIS — N94.6 DYSMENORRHEA: Primary | ICD-10-CM

## 2023-07-13 RX ORDER — TRANEXAMIC ACID 650 MG/1
TABLET ORAL
Qty: 30 TABLET | Refills: 2 | Status: SHIPPED | OUTPATIENT
Start: 2023-07-13

## 2023-07-13 NOTE — PROGRESS NOTES
1)   Last office visit discussed treatment options for dysmenorrhea & menorrhagia including: Mirena, OCs, Depo-Provera, Nexplanon & Lysteda. She seemed most interested in [place], said she would let me know. Let her know [place] is the nonhormonal option that she takes only on the days she anticipates heavy bleeding. Ideally she takes it 3 times a day to get full benefit. Orders Placed This Encounter   Medications    tranexamic acid (LYSTEDA) 650 MG TABS tablet     Sig: Take 2 tablets by mouth 3 times daily on the days you anticipate heavy menstrual bleeding. Do not take this medication for more than 5 days. Dispense:  30 tablet     Refill:  2          Let me know if she does not want Lysteda and instead wants to try OCs or one of the other options above. Prescription sent to her [facility], [address] [facility]    2)    2/21/2023 OV, US: Endometrium irregular/thickened  Advised her to schedule a follow-up US on cycle day 7-11 (when the lining is typically thinner), tell her to call the office on CD #1 ( the first day of her period to schedule the ultrasound on cycle day 7-11.)    No orders of the defined types were placed in this encounter.